# Patient Record
Sex: FEMALE | Race: WHITE | NOT HISPANIC OR LATINO | Employment: FULL TIME | URBAN - METROPOLITAN AREA
[De-identification: names, ages, dates, MRNs, and addresses within clinical notes are randomized per-mention and may not be internally consistent; named-entity substitution may affect disease eponyms.]

---

## 2019-02-26 ENCOUNTER — PROCEDURE VISIT (OUTPATIENT)
Dept: OBSTETRICS AND GYNECOLOGY | Facility: CLINIC | Age: 39
End: 2019-02-26

## 2019-02-26 ENCOUNTER — PREP FOR SURGERY (OUTPATIENT)
Dept: OTHER | Facility: HOSPITAL | Age: 39
End: 2019-02-26

## 2019-02-26 ENCOUNTER — OFFICE VISIT (OUTPATIENT)
Dept: OBSTETRICS AND GYNECOLOGY | Facility: CLINIC | Age: 39
End: 2019-02-26

## 2019-02-26 VITALS
SYSTOLIC BLOOD PRESSURE: 120 MMHG | BODY MASS INDEX: 34.8 KG/M2 | HEIGHT: 69 IN | WEIGHT: 235 LBS | DIASTOLIC BLOOD PRESSURE: 70 MMHG

## 2019-02-26 DIAGNOSIS — D50.8 OTHER IRON DEFICIENCY ANEMIA: ICD-10-CM

## 2019-02-26 DIAGNOSIS — D21.9 FIBROID: ICD-10-CM

## 2019-02-26 DIAGNOSIS — N92.1 MENOMETRORRHAGIA: Primary | ICD-10-CM

## 2019-02-26 DIAGNOSIS — N93.9 VAGINAL BLEEDING: Primary | ICD-10-CM

## 2019-02-26 DIAGNOSIS — D25.1 INTRAMURAL LEIOMYOMA OF UTERUS: ICD-10-CM

## 2019-02-26 LAB
HCT VFR BLD AUTO: 36.2 % (ref 34–46.6)
HGB BLD-MCNC: 9.2 G/DL (ref 12–15.9)

## 2019-02-26 PROCEDURE — 76856 US EXAM PELVIC COMPLETE: CPT | Performed by: OBSTETRICS & GYNECOLOGY

## 2019-02-26 PROCEDURE — 99204 OFFICE O/P NEW MOD 45 MIN: CPT | Performed by: OBSTETRICS & GYNECOLOGY

## 2019-02-26 RX ORDER — SODIUM CHLORIDE 0.9 % (FLUSH) 0.9 %
3 SYRINGE (ML) INJECTION EVERY 12 HOURS SCHEDULED
Status: CANCELLED | OUTPATIENT
Start: 2019-02-26

## 2019-02-26 RX ORDER — IRON POLYSACCHARIDE COMPLEX 150 MG
150 CAPSULE ORAL 2 TIMES DAILY
COMMUNITY

## 2019-02-26 RX ORDER — SODIUM CHLORIDE 9 MG/ML
40 INJECTION, SOLUTION INTRAVENOUS AS NEEDED
Status: CANCELLED | OUTPATIENT
Start: 2019-02-26

## 2019-02-26 RX ORDER — SODIUM CHLORIDE 0.9 % (FLUSH) 0.9 %
1-10 SYRINGE (ML) INJECTION AS NEEDED
Status: CANCELLED | OUTPATIENT
Start: 2019-02-26

## 2019-02-26 RX ORDER — CEFAZOLIN SODIUM 2 G/50ML
2 SOLUTION INTRAVENOUS ONCE
Status: CANCELLED | OUTPATIENT
Start: 2019-02-26

## 2019-02-26 RX ORDER — UBIDECARENONE 75 MG
50 CAPSULE ORAL DAILY
COMMUNITY

## 2019-02-26 NOTE — PROGRESS NOTES
"PROBLEM VISIT    Chief Complaint: menometrorrhagia      April Pieter is a 38 y.o. patient who presents as a new patient. Pt reports she is having menometrorrhagia. She is getting her cycle every month and it is lasting 14 days. She bleeds very heavy for 1-2 days with pad/tampon changes q 1 hour. Pt was seen by her primary care physician and fond to have a hgb of 7. Pt was seen by a gynecologist and had a pap smear done in 2018- normal. Pt saw her gynecologist for her bleeding and was placed on OCPs. Pt reports that she took the pill but she was getting HAs so she stopped the meds. . Pt is not interested in more children. She is currently on iron. Pt started her cycle 2 days ago. TVUS performed today reveals 8x8cm fibroid.    Chief Complaint   Patient presents with   • Menstrual Problem   • Pelvic Pain             The following portions of the patient's history were reviewed and updated as appropriate: allergies, current medications and problem list.    Review of Systems   Constitutional: Negative for diaphoresis and unexpected weight change.   Respiratory: Negative for shortness of breath.    Cardiovascular: Negative for chest pain.   Gastrointestinal: Negative for abdominal distention, abdominal pain and blood in stool.   Endocrine: Negative for cold intolerance and heat intolerance.   Genitourinary: Positive for menstrual problem, vaginal bleeding and vaginal pain. Negative for vaginal discharge.   Musculoskeletal: Negative for back pain and gait problem.   Neurological: Negative for dizziness, syncope, light-headedness and headaches.   Hematological: Does not bruise/bleed easily.   Psychiatric/Behavioral: Negative for agitation, behavioral problems and dysphoric mood.       /70   Ht 175.3 cm (69\")   Wt 107 kg (235 lb)   LMP 2019   Breastfeeding? No   BMI 34.70 kg/m²     Physical Exam   Constitutional: She is oriented to person, place, and time. She appears well-developed and " well-nourished. No distress.   Cardiovascular: Normal rate, regular rhythm and normal heart sounds.   Pulmonary/Chest: Effort normal and breath sounds normal. No stridor. No respiratory distress. She has no wheezes.   Abdominal: Soft. Bowel sounds are normal. She exhibits no distension. There is no tenderness. There is no guarding.   Genitourinary: There is no rash, tenderness, lesion or injury on the right labia. There is no rash, tenderness, lesion or injury on the left labia. Uterus is enlarged. Uterus is not deviated, not fixed and not tender. Cervix exhibits no motion tenderness, no discharge and no friability. Right adnexum displays no mass, no tenderness and no fullness. Left adnexum displays no mass, no tenderness and no fullness. There is bleeding in the vagina. No erythema or tenderness in the vagina. No foreign body in the vagina. No signs of injury around the vagina. No vaginal discharge found.   Neurological: She is alert and oriented to person, place, and time. No cranial nerve deficit or sensory deficit. Coordination normal.   Skin: Skin is warm. She is not diaphoretic. There is pallor.   Psychiatric: She has a normal mood and affect. Her behavior is normal. Judgment and thought content normal.   Vitals reviewed.        Assessment/Plan   April was seen today for menstrual problem and pelvic pain.    Diagnoses and all orders for this visit:    Menometrorrhagia    Intramural leiomyoma of uterus    Other iron deficiency anemia  -     Hemoglobin & Hematocrit, Blood    38-year-old with menometrorrhagia, anemia, and fibroid uterus    1)menometrorrhagia: Pt with extremely heavy menstrual cycles.  Patient will bleed heavily and up to 2 weeks.  Etiology of her bleeding is most likely related to her large fibroid uterus measuring 8 x 8 cm on today's transvaginal ultrasound.  Ovaries were noted to be normal bilaterally.  Patient's bleeding is so heavy that she is anemic.  Her hemoglobin has been as low as 7.   Patient currently is on iron.  A long discussion was had with patient concerning her chronic anemia due to her abnormal uterine bleeding.  Patient was offered a hysterectomy to treat her abnormal uterine bleeding and anemia.  Patient is finished with childbearing.  The fibroid measuring 8 x 8 cm may limit a laparoscopic hysterectomy.  I reviewed a laparoscopic hysterectomy as well as an exploratory laparotomy with abdominal hysterectomy with the patient.  We discussed the pre-intra-and post procedure and all questions were answered.  The risks of the procedure were reviewed including risk of bleeding and blood transfusion, risk of infection, damage to bowel and bladder, damage to ureters, and risk of DVT.  We will check a hemoglobin today and I reviewed with the patient that she may need to be admitted preoperatively for blood transfusion.     2) anemia:  is currently taking iron twice a day.  Patient was encouraged to continue taking her iron supplements.  We will check a hemoglobin today in preparation for hysterectomy.  If patient's hemoglobin is less than 9 that she'll need to be admitted for preoperative blood transfusion prior to proceeding with surgery.    3) fibroid uterus: Transvaginal ultrasound today reveals an intramural fibroid measuring 8 x 8 x 8 cm.  We discussed hysterectomy to remove the fibroid and that attempts would be made to remove this laparoscopically.  Patient is aware that there is a risk of exploratory lap porotomy with total abdominal hysterectomy.    4) gyn HM: LPS 2/2019.            No Follow-up on file.      Rosemarie Garay DO    2/27/2019  2:24 PM

## 2019-02-27 ENCOUNTER — TELEPHONE (OUTPATIENT)
Dept: OBSTETRICS AND GYNECOLOGY | Facility: CLINIC | Age: 39
End: 2019-02-27

## 2019-02-27 PROBLEM — D25.1 INTRAMURAL LEIOMYOMA OF UTERUS: Status: ACTIVE | Noted: 2019-02-27

## 2019-02-27 PROBLEM — N92.1 MENOMETRORRHAGIA: Status: ACTIVE | Noted: 2019-02-27

## 2019-02-27 PROBLEM — D64.9 ABSOLUTE ANEMIA: Status: ACTIVE | Noted: 2019-02-27

## 2019-02-27 NOTE — TELEPHONE ENCOUNTER
We need to continue and try to get in touch with this pt. I want to make sure she schedules her surgery and make her aware that her hgb has improved on iron.

## 2019-02-27 NOTE — TELEPHONE ENCOUNTER
I spoke with her just now, she is very excited to get the news, she said she is going to try to get it scheduled for the beginning of April.

## 2019-03-22 ENCOUNTER — HOSPITAL ENCOUNTER (OUTPATIENT)
Facility: HOSPITAL | Age: 39
Setting detail: SURGERY ADMIT
End: 2019-03-22
Attending: OBSTETRICS & GYNECOLOGY | Admitting: OBSTETRICS & GYNECOLOGY

## 2019-03-26 ENCOUNTER — APPOINTMENT (OUTPATIENT)
Dept: PREADMISSION TESTING | Facility: HOSPITAL | Age: 39
End: 2019-03-26

## 2019-03-26 VITALS
DIASTOLIC BLOOD PRESSURE: 78 MMHG | HEART RATE: 102 BPM | RESPIRATION RATE: 16 BRPM | SYSTOLIC BLOOD PRESSURE: 126 MMHG | OXYGEN SATURATION: 100 % | HEIGHT: 69 IN | BODY MASS INDEX: 35 KG/M2 | WEIGHT: 236.3 LBS

## 2019-03-26 DIAGNOSIS — N92.1 MENOMETRORRHAGIA: ICD-10-CM

## 2019-03-26 LAB
ABO GROUP BLD: NORMAL
ABO GROUP BLD: NORMAL
BLD GP AB SCN SERPL QL: NEGATIVE
DEPRECATED RDW RBC AUTO: 48.6 FL (ref 37–54)
ERYTHROCYTE [DISTWIDTH] IN BLOOD BY AUTOMATED COUNT: 18.8 % (ref 12.3–15.4)
HCT VFR BLD AUTO: 30.3 % (ref 34–46.6)
HGB BLD-MCNC: 8 G/DL (ref 12–15.9)
MCH RBC QN AUTO: 19.2 PG (ref 26.6–33)
MCHC RBC AUTO-ENTMCNC: 26.4 G/DL (ref 31.5–35.7)
MCV RBC AUTO: 72.8 FL (ref 79–97)
PLATELET # BLD AUTO: 392 10*3/MM3 (ref 140–450)
PMV BLD AUTO: 9.4 FL (ref 6–12)
RBC # BLD AUTO: 4.16 10*6/MM3 (ref 3.77–5.28)
RH BLD: POSITIVE
RH BLD: POSITIVE
T&S EXPIRATION DATE: NORMAL
WBC NRBC COR # BLD: 8.93 10*3/MM3 (ref 3.4–10.8)

## 2019-03-26 PROCEDURE — 86900 BLOOD TYPING SEROLOGIC ABO: CPT | Performed by: OBSTETRICS & GYNECOLOGY

## 2019-03-26 PROCEDURE — 86901 BLOOD TYPING SEROLOGIC RH(D): CPT | Performed by: OBSTETRICS & GYNECOLOGY

## 2019-03-26 PROCEDURE — 86900 BLOOD TYPING SEROLOGIC ABO: CPT

## 2019-03-26 PROCEDURE — 86901 BLOOD TYPING SEROLOGIC RH(D): CPT

## 2019-03-26 PROCEDURE — 85027 COMPLETE CBC AUTOMATED: CPT | Performed by: OBSTETRICS & GYNECOLOGY

## 2019-03-26 PROCEDURE — 86850 RBC ANTIBODY SCREEN: CPT | Performed by: OBSTETRICS & GYNECOLOGY

## 2019-03-26 PROCEDURE — 36415 COLL VENOUS BLD VENIPUNCTURE: CPT

## 2019-03-26 NOTE — DISCHARGE INSTRUCTIONS
PRE-ADMISSION TESTING INSTRUCTIONS FOR ADULTS    Take these medications the morning of surgery with a small sip of water:      No aspirin, advil, aleve, ibuprofen, naproxen, diet pills, decongestants, or herbal/vitamins for a week prior to surgery.    General Instructions:    • Do not eat solid food after midnight the night before surgery.  No gum, mints, or hard candy after midnight the night before surgery.  • You may drink clear liquids the day of surgery up until 2 hours before your arrival time.  (Until 55:30 am)  • Clear liquids are liquids you can see through. Nothing RED in color.    Plain water    Sports drinks  Sodas     Gelatin (Jell-O)  Fruit juices without pulp such as white grape juice and apple juice  Popsicles that contain no fruit or yogurt  Tea or coffee (no cream or milk added)    • It is beneficial for you to have a clear drink that contains carbohydrates just before you leave your house and before your fasting time begins.  We suggest a 20 ounce bottle of Gatorade or Powerade for non-diabetic patients or a 20 ounce bottle of G2 or Powerade Zero for diabetic patients.     • Patients who avoid smoking, chewing tobacco and alcohol for 4 weeks prior to surgery have a reduced risk of post-operative complications.  If at all possible, quit smoking as many days before surgery as you can.    • Do not smoke, use chewing tobacco or drink alcohol the day of surgery    • Bring your C-PAP/ BI-PAP machine if you use one.  • Wear clean comfortable clothes and socks.  • Do not wear contact lenses, lotion, deodorant, or make-up.  Bring a case for your glasses if applicable. You may brush your teeth the morning of surgery.  • You may wear dentures/partials, do not put adhesive/glue on them.  • Bring crutches or walker if applicable.  • Leave all other jewelry and valuables at home.      Preventing a Surgical Site Infection:    • Shower the night before and on the morning of surgery using the chlorhexidine soap  you were given.  Use a clean washcloth with the soap.  Place clean sheets on your bed after showering the night before surgery. Do not use the CHG soap on your hair, face, or private areas. Wash your body gently for five (5) minutes. Do not scrub your skin.  Dry with a clean towel and dress in clean clothing.    • Do not shave the surgical area for 10 days-2 weeks prior to surgery  because the razor can irritate skin and make it easier to develop an infection.  • Make sure you, your family, and all healthcare providers clean their hands with soap and water or an alcohol based hand  before caring for you or your wound.      Day of surgery:    Your surgeon’s office will advise you of your arrival time for the day of surgery.    Upon arrival, a Pre-op nurse and Anesthesia provider will review your health history, obtain vital signs, and answer questions you may have.  The only belongings needed at this time will be your home medications and if applicable your C-PAP/BI-PAP machine.  If you are staying overnight your family can leave the rest of your belongings in the car and bring them to your room later.  A Pre-op nurse will start an IV and you may receive medication in preparation for surgery, including something to help you relax.  Your family will be able to see you in the Pre-op area.  While you are in surgery your family should notify the waiting room  if they leave the waiting room area and provide a contact phone number.    IF you have any questions, you can call the Pre-Admission Department at [phone] or your surgeon's office.  Notify your surgeon if  you become sick, have a fever, productive cough, or cannot be here the day of surgery    Please be aware that surgery does come with discomfort.  We want to make every effort to control your discomfort so please discuss any uncontrolled symptoms with your nurse.   Your doctor will most likely have prescribed pain medications.      If  you are going home after surgery, you will receive individualized written care instructions before being discharged.  A responsible adult (over the age of 18) must drive you to and from the hospital on the day of your surgery and stay with you for 24 hours after anesthesia.    If you are staying overnight following surgery, you will be transported to your hospital room following the recovery period.  [facility] has all private rooms.    You may receive a survey regarding the care you received. Your feedback is very important and will be used to collect the necessary data to help us to continue to provide excellent care.     Deductibles and co-payments are collected on the day of service. Please be prepared to pay the required co-pay, deductible or deposit on the day of service as defined by your plan.    Total Laparoscopic Hysterectomy  A total laparoscopic hysterectomy is a minimally invasive surgery to remove your uterus and cervix. This surgery is performed by making several small cuts (incisions) in your abdomen. It can also be done with a thin, lighted tube (laparoscope) inserted into two small incisions in your lower abdomen. Your fallopian tubes and ovaries can be removed (bilateral salpingo-oophorectomy) during this surgery as well. Benefits of minimally invasive surgery include:  · Less pain.  · Less risk of blood loss.  · Less risk of infection.  · Quicker return to normal activities.  LET YOUR HEALTH CARE PROVIDER KNOW ABOUT:  · Any allergies you have.  · All medicines you are taking, including vitamins, herbs, eye drops, creams, and over-the-counter medicines.  · Previous problems you or members of your family have had with the use of anesthetics.  · Any blood disorders you have.  · Previous surgeries you have had.  · Medical conditions you have.  RISKS AND COMPLICATIONS   Generally, this is a safe procedure. However, as with any procedure, complications can occur. Possible complications  include:  · Bleeding.  · Blood clots in the legs or lung.  · Infection.  · Injury to surrounding organs.  · Problems with anesthesia.  · Early menopause symptoms (hot flashes, night sweats, insomnia).  · Risk of conversion to an open abdominal incision.  BEFORE THE PROCEDURE  · Ask your health care provider about changing or stopping your regular medicines.  · Do not take aspirin or blood thinners (anticoagulants) for 1 week before the surgery or as told by your health care provider.  · Do not eat or drink anything for 8 hours before the surgery or as told by your health care provider.  · Quit smoking if you smoke.  · Arrange for a ride home after surgery and for someone to help you at home during recovery.  PROCEDURE   · You will be given antibiotic medicine.  · An IV tube will be placed in your arm. You will be given medicine to make you sleep (general anesthetic).  · A peterson catheter will be placed to drain your bladder during surgery. This is usually removed within 24 hours.  · A gas (carbon dioxide) will be used to inflate your abdomen. This will allow your surgeon to look inside your abdomen, perform your surgery, and treat any other problems found if necessary.  · Three or four small incisions (often less than 1/2 inch) will be made in your abdomen. One of these incisions will be made in the area of your belly button (navel). The laparoscope will be inserted into the incision. Your surgeon will look through the laparoscope while doing your procedure.  · Other surgical instruments will be inserted through the other incisions.  · Your uterus may be removed through your vagina or cut into small pieces and removed through the small incisions.  · Your incisions will be closed.  AFTER THE PROCEDURE  · The gas will be released from inside your abdomen.  · You will be taken to the recovery area where a nurse will watch and check your progress. Once you are awake, stable, and taking fluids well, without other  problems, you will return to your room or be allowed to go home.  · There is usually minimal discomfort following the surgery because the incisions are so small.  · You will be given pain medicine while you are in the hospital and for when you go home.     This information is not intended to replace advice given to you by your health care provider. Make sure you discuss any questions you have with your health care provider.     Document Released: 10/14/2008 Document Revised: 08/20/2014 Document Reviewed: 07/08/2014  [employer]® Patient Information ©2016 [employer], [employer].

## 2019-03-26 NOTE — PAT
Pt and mother here for PAT visit.  Pre-op tests completed, chg soap given, and instructions reviewed.  Instructed clears until 5:30 am dos, voiced understanding.

## 2019-03-27 ENCOUNTER — TELEPHONE (OUTPATIENT)
Dept: OBSTETRICS AND GYNECOLOGY | Facility: CLINIC | Age: 39
End: 2019-03-27

## 2019-03-27 NOTE — TELEPHONE ENCOUNTER
She does this every month she has a cycle. She needs to make sure she is taking iron twice a day. She may need a stool softener. Did I order her surgery?

## 2019-04-02 ENCOUNTER — HOSPITAL ENCOUNTER (INPATIENT)
Facility: HOSPITAL | Age: 39
LOS: 3 days | Discharge: HOME | End: 2019-04-05
Attending: OBSTETRICS & GYNECOLOGY | Admitting: OBSTETRICS & GYNECOLOGY

## 2019-04-02 ENCOUNTER — ANESTHESIA EVENT (OUTPATIENT)
Dept: PERIOP | Facility: HOSPITAL | Age: 39
End: 2019-04-02

## 2019-04-02 DIAGNOSIS — N92.1 MENOMETRORRHAGIA: ICD-10-CM

## 2019-04-02 LAB
ABO GROUP BLD: NORMAL
AMPHET+METHAMPHET UR QL: NEGATIVE
AMPHETAMINES UR QL: NEGATIVE
BARBITURATES UR QL SCN: NEGATIVE
BENZODIAZ UR QL SCN: NEGATIVE
BLD GP AB SCN SERPL QL: NEGATIVE
BUPRENORPHINE SERPL-MCNC: NEGATIVE NG/ML
CANNABINOIDS SERPL QL: NEGATIVE
COCAINE UR QL: NEGATIVE
METHADONE UR QL SCN: NEGATIVE
OPIATES UR QL: NEGATIVE
OXYCODONE UR QL SCN: NEGATIVE
PCP UR QL SCN: NEGATIVE
PROPOXYPH UR QL: NEGATIVE
RH BLD: POSITIVE
T&S EXPIRATION DATE: NORMAL
TRICYCLICS UR QL SCN: NEGATIVE

## 2019-04-02 PROCEDURE — P9016 RBC LEUKOCYTES REDUCED: HCPCS

## 2019-04-02 PROCEDURE — 86901 BLOOD TYPING SEROLOGIC RH(D): CPT | Performed by: OBSTETRICS & GYNECOLOGY

## 2019-04-02 PROCEDURE — 86850 RBC ANTIBODY SCREEN: CPT | Performed by: OBSTETRICS & GYNECOLOGY

## 2019-04-02 PROCEDURE — 86900 BLOOD TYPING SEROLOGIC ABO: CPT

## 2019-04-02 PROCEDURE — 36430 TRANSFUSION BLD/BLD COMPNT: CPT

## 2019-04-02 PROCEDURE — 86900 BLOOD TYPING SEROLOGIC ABO: CPT | Performed by: OBSTETRICS & GYNECOLOGY

## 2019-04-02 PROCEDURE — 80306 DRUG TEST PRSMV INSTRMNT: CPT | Performed by: OBSTETRICS & GYNECOLOGY

## 2019-04-02 PROCEDURE — 86923 COMPATIBILITY TEST ELECTRIC: CPT

## 2019-04-02 RX ORDER — SODIUM CHLORIDE 9 MG/ML
40 INJECTION, SOLUTION INTRAVENOUS AS NEEDED
Status: DISCONTINUED | OUTPATIENT
Start: 2019-04-02 | End: 2019-04-03

## 2019-04-02 RX ORDER — SODIUM CHLORIDE 0.9 % (FLUSH) 0.9 %
3 SYRINGE (ML) INJECTION EVERY 12 HOURS SCHEDULED
Status: CANCELLED | OUTPATIENT
Start: 2019-04-02

## 2019-04-02 RX ORDER — ZOLPIDEM TARTRATE 5 MG/1
5 TABLET ORAL NIGHTLY PRN
Status: DISCONTINUED | OUTPATIENT
Start: 2019-04-02 | End: 2019-04-03

## 2019-04-02 RX ORDER — SODIUM CHLORIDE 0.9 % (FLUSH) 0.9 %
3 SYRINGE (ML) INJECTION EVERY 12 HOURS SCHEDULED
Status: DISCONTINUED | OUTPATIENT
Start: 2019-04-02 | End: 2019-04-03

## 2019-04-02 RX ORDER — LIDOCAINE HYDROCHLORIDE 10 MG/ML
0.5 INJECTION, SOLUTION EPIDURAL; INFILTRATION; INTRACAUDAL; PERINEURAL ONCE AS NEEDED
Status: CANCELLED | OUTPATIENT
Start: 2019-04-02

## 2019-04-02 RX ORDER — SODIUM CHLORIDE 0.9 % (FLUSH) 0.9 %
3-10 SYRINGE (ML) INJECTION AS NEEDED
Status: DISCONTINUED | OUTPATIENT
Start: 2019-04-02 | End: 2019-04-03

## 2019-04-02 RX ORDER — SODIUM CHLORIDE 9 MG/ML
INJECTION, SOLUTION INTRAVENOUS
Status: COMPLETED
Start: 2019-04-02 | End: 2019-04-02

## 2019-04-02 RX ORDER — SODIUM CHLORIDE 9 MG/ML
INJECTION, SOLUTION INTRAVENOUS
Status: COMPLETED
Start: 2019-04-02 | End: 2019-04-03

## 2019-04-02 RX ORDER — SODIUM CHLORIDE 9 MG/ML
40 INJECTION, SOLUTION INTRAVENOUS AS NEEDED
Status: CANCELLED | OUTPATIENT
Start: 2019-04-02

## 2019-04-02 RX ORDER — SODIUM CHLORIDE 0.9 % (FLUSH) 0.9 %
1-10 SYRINGE (ML) INJECTION AS NEEDED
Status: CANCELLED | OUTPATIENT
Start: 2019-04-02

## 2019-04-02 RX ORDER — SODIUM CHLORIDE, SODIUM LACTATE, POTASSIUM CHLORIDE, CALCIUM CHLORIDE 600; 310; 30; 20 MG/100ML; MG/100ML; MG/100ML; MG/100ML
9 INJECTION, SOLUTION INTRAVENOUS CONTINUOUS
Status: CANCELLED | OUTPATIENT
Start: 2019-04-02

## 2019-04-02 RX ADMIN — SODIUM CHLORIDE 40 ML: 9 INJECTION, SOLUTION INTRAVENOUS at 21:39

## 2019-04-03 ENCOUNTER — ANESTHESIA (OUTPATIENT)
Dept: PERIOP | Facility: HOSPITAL | Age: 39
End: 2019-04-03

## 2019-04-03 PROBLEM — D50.0 IRON DEFICIENCY ANEMIA DUE TO CHRONIC BLOOD LOSS: Status: ACTIVE | Noted: 2019-02-27

## 2019-04-03 LAB
ABO + RH BLD: NORMAL
ABO + RH BLD: NORMAL
BH BB BLOOD EXPIRATION DATE: NORMAL
BH BB BLOOD EXPIRATION DATE: NORMAL
BH BB BLOOD TYPE BARCODE: 6200
BH BB BLOOD TYPE BARCODE: 6200
BH BB DISPENSE STATUS: NORMAL
BH BB DISPENSE STATUS: NORMAL
BH BB PRODUCT CODE: NORMAL
BH BB PRODUCT CODE: NORMAL
BH BB UNIT NUMBER: NORMAL
BH BB UNIT NUMBER: NORMAL
DEPRECATED RDW RBC AUTO: 55.6 FL (ref 37–54)
ERYTHROCYTE [DISTWIDTH] IN BLOOD BY AUTOMATED COUNT: 20.3 % (ref 12.3–15.4)
HCG SERPL QL: NEGATIVE
HCT VFR BLD AUTO: 34 % (ref 34–46.6)
HGB BLD-MCNC: 9.5 G/DL (ref 12–15.9)
MCH RBC QN AUTO: 21.3 PG (ref 26.6–33)
MCHC RBC AUTO-ENTMCNC: 27.9 G/DL (ref 31.5–35.7)
MCV RBC AUTO: 76.4 FL (ref 79–97)
PLATELET # BLD AUTO: 329 10*3/MM3 (ref 140–450)
PMV BLD AUTO: 9 FL (ref 6–12)
RBC # BLD AUTO: 4.45 10*6/MM3 (ref 3.77–5.28)
UNIT  ABO: NORMAL
UNIT  ABO: NORMAL
UNIT  RH: NORMAL
UNIT  RH: NORMAL
WBC NRBC COR # BLD: 6.74 10*3/MM3 (ref 3.4–10.8)

## 2019-04-03 PROCEDURE — 0WJJ4ZZ INSPECTION OF PELVIC CAVITY, PERCUTANEOUS ENDOSCOPIC APPROACH: ICD-10-PCS | Performed by: OBSTETRICS & GYNECOLOGY

## 2019-04-03 PROCEDURE — 25010000002 FENTANYL CITRATE (PF) 100 MCG/2ML SOLUTION: Performed by: NURSE ANESTHETIST, CERTIFIED REGISTERED

## 2019-04-03 PROCEDURE — 25010000002 PROPOFOL 10 MG/ML EMULSION: Performed by: NURSE ANESTHETIST, CERTIFIED REGISTERED

## 2019-04-03 PROCEDURE — 0UT90ZZ RESECTION OF UTERUS, OPEN APPROACH: ICD-10-PCS | Performed by: OBSTETRICS & GYNECOLOGY

## 2019-04-03 PROCEDURE — 85027 COMPLETE CBC AUTOMATED: CPT | Performed by: OBSTETRICS & GYNECOLOGY

## 2019-04-03 PROCEDURE — P9016 RBC LEUKOCYTES REDUCED: HCPCS

## 2019-04-03 PROCEDURE — 25010000002 MIDAZOLAM PER 1 MG: Performed by: NURSE ANESTHETIST, CERTIFIED REGISTERED

## 2019-04-03 PROCEDURE — S0260 H&P FOR SURGERY: HCPCS | Performed by: OBSTETRICS & GYNECOLOGY

## 2019-04-03 PROCEDURE — 88307 TISSUE EXAM BY PATHOLOGIST: CPT | Performed by: OBSTETRICS & GYNECOLOGY

## 2019-04-03 PROCEDURE — 25010000002 SUCCINYLCHOLINE PER 20 MG: Performed by: NURSE ANESTHETIST, CERTIFIED REGISTERED

## 2019-04-03 PROCEDURE — 25010000002 DEXAMETHASONE PER 1 MG: Performed by: NURSE ANESTHETIST, CERTIFIED REGISTERED

## 2019-04-03 PROCEDURE — 0UT70ZZ RESECTION OF BILATERAL FALLOPIAN TUBES, OPEN APPROACH: ICD-10-PCS | Performed by: OBSTETRICS & GYNECOLOGY

## 2019-04-03 PROCEDURE — 25010000002 ONDANSETRON PER 1 MG: Performed by: NURSE ANESTHETIST, CERTIFIED REGISTERED

## 2019-04-03 PROCEDURE — 36430 TRANSFUSION BLD/BLD COMPNT: CPT

## 2019-04-03 PROCEDURE — 94799 UNLISTED PULMONARY SVC/PX: CPT

## 2019-04-03 PROCEDURE — 25010000003 CEFAZOLIN SODIUM-DEXTROSE 2-3 GM-%(50ML) RECONSTITUTED SOLUTION: Performed by: OBSTETRICS & GYNECOLOGY

## 2019-04-03 PROCEDURE — 86900 BLOOD TYPING SEROLOGIC ABO: CPT

## 2019-04-03 PROCEDURE — 25010000002 DIPHENHYDRAMINE PER 50 MG: Performed by: NURSE ANESTHETIST, CERTIFIED REGISTERED

## 2019-04-03 PROCEDURE — 25010000002 HYDROMORPHONE 1 MG/ML SOLUTION: Performed by: NURSE ANESTHETIST, CERTIFIED REGISTERED

## 2019-04-03 PROCEDURE — 58150 TOTAL HYSTERECTOMY: CPT | Performed by: OBSTETRICS & GYNECOLOGY

## 2019-04-03 PROCEDURE — 25010000002 KETOROLAC TROMETHAMINE PER 15 MG: Performed by: NURSE ANESTHETIST, CERTIFIED REGISTERED

## 2019-04-03 PROCEDURE — 25010000002 KETOROLAC TROMETHAMINE PER 15 MG

## 2019-04-03 PROCEDURE — 25010000002 NEOSTIGMINE PER 0.5 MG: Performed by: NURSE ANESTHETIST, CERTIFIED REGISTERED

## 2019-04-03 PROCEDURE — 25010000002 MORPHINE PER 10 MG: Performed by: NURSE ANESTHETIST, CERTIFIED REGISTERED

## 2019-04-03 PROCEDURE — 84703 CHORIONIC GONADOTROPIN ASSAY: CPT | Performed by: OBSTETRICS & GYNECOLOGY

## 2019-04-03 RX ORDER — MIDAZOLAM HYDROCHLORIDE 1 MG/ML
1 INJECTION INTRAMUSCULAR; INTRAVENOUS
Status: DISCONTINUED | OUTPATIENT
Start: 2019-04-03 | End: 2019-04-03 | Stop reason: HOSPADM

## 2019-04-03 RX ORDER — SIMETHICONE 20 MG/.3ML
EMULSION ORAL
Status: COMPLETED
Start: 2019-04-03 | End: 2019-04-03

## 2019-04-03 RX ORDER — LORAZEPAM 2 MG/ML
0.5 INJECTION INTRAMUSCULAR
Status: DISCONTINUED | OUTPATIENT
Start: 2019-04-03 | End: 2019-04-03 | Stop reason: HOSPADM

## 2019-04-03 RX ORDER — DIPHENHYDRAMINE HCL 25 MG
25 CAPSULE ORAL EVERY 4 HOURS PRN
Status: DISCONTINUED | OUTPATIENT
Start: 2019-04-03 | End: 2019-04-05 | Stop reason: HOSPADM

## 2019-04-03 RX ORDER — MEPERIDINE HYDROCHLORIDE 25 MG/ML
12.5 INJECTION INTRAMUSCULAR; INTRAVENOUS; SUBCUTANEOUS
Status: DISCONTINUED | OUTPATIENT
Start: 2019-04-03 | End: 2019-04-03 | Stop reason: HOSPADM

## 2019-04-03 RX ORDER — PROMETHAZINE HYDROCHLORIDE 25 MG/ML
6.25 INJECTION, SOLUTION INTRAMUSCULAR; INTRAVENOUS ONCE AS NEEDED
Status: DISCONTINUED | OUTPATIENT
Start: 2019-04-03 | End: 2019-04-03 | Stop reason: HOSPADM

## 2019-04-03 RX ORDER — ONDANSETRON 2 MG/ML
4 INJECTION INTRAMUSCULAR; INTRAVENOUS ONCE AS NEEDED
Status: COMPLETED | OUTPATIENT
Start: 2019-04-03 | End: 2019-04-03

## 2019-04-03 RX ORDER — DEXAMETHASONE SODIUM PHOSPHATE 4 MG/ML
8 INJECTION, SOLUTION INTRA-ARTICULAR; INTRALESIONAL; INTRAMUSCULAR; INTRAVENOUS; SOFT TISSUE ONCE AS NEEDED
Status: COMPLETED | OUTPATIENT
Start: 2019-04-03 | End: 2019-04-03

## 2019-04-03 RX ORDER — GLYCOPYRROLATE 0.2 MG/ML
INJECTION INTRAMUSCULAR; INTRAVENOUS AS NEEDED
Status: DISCONTINUED | OUTPATIENT
Start: 2019-04-03 | End: 2019-04-03 | Stop reason: SURG

## 2019-04-03 RX ORDER — ONDANSETRON 2 MG/ML
4 INJECTION INTRAMUSCULAR; INTRAVENOUS ONCE AS NEEDED
Status: DISCONTINUED | OUTPATIENT
Start: 2019-04-03 | End: 2019-04-03 | Stop reason: HOSPADM

## 2019-04-03 RX ORDER — OXYCODONE HYDROCHLORIDE AND ACETAMINOPHEN 5; 325 MG/1; MG/1
1 TABLET ORAL EVERY 4 HOURS PRN
Status: DISCONTINUED | OUTPATIENT
Start: 2019-04-04 | End: 2019-04-05 | Stop reason: HOSPADM

## 2019-04-03 RX ORDER — SODIUM CHLORIDE 0.9 % (FLUSH) 0.9 %
1-10 SYRINGE (ML) INJECTION AS NEEDED
Status: DISCONTINUED | OUTPATIENT
Start: 2019-04-03 | End: 2019-04-03 | Stop reason: HOSPADM

## 2019-04-03 RX ORDER — DOCUSATE SODIUM 100 MG/1
100 CAPSULE, LIQUID FILLED ORAL 2 TIMES DAILY PRN
Status: DISCONTINUED | OUTPATIENT
Start: 2019-04-03 | End: 2019-04-05 | Stop reason: HOSPADM

## 2019-04-03 RX ORDER — SIMETHICONE 80 MG
80 TABLET,CHEWABLE ORAL 4 TIMES DAILY PRN
Status: DISCONTINUED | OUTPATIENT
Start: 2019-04-03 | End: 2019-04-05 | Stop reason: HOSPADM

## 2019-04-03 RX ORDER — MAGNESIUM HYDROXIDE 1200 MG/15ML
LIQUID ORAL AS NEEDED
Status: DISCONTINUED | OUTPATIENT
Start: 2019-04-03 | End: 2019-04-03 | Stop reason: HOSPADM

## 2019-04-03 RX ORDER — PROMETHAZINE HYDROCHLORIDE 25 MG/1
25 SUPPOSITORY RECTAL ONCE AS NEEDED
Status: DISCONTINUED | OUTPATIENT
Start: 2019-04-03 | End: 2019-04-03 | Stop reason: HOSPADM

## 2019-04-03 RX ORDER — DIPHENHYDRAMINE HYDROCHLORIDE 50 MG/ML
25 INJECTION INTRAMUSCULAR; INTRAVENOUS EVERY 4 HOURS PRN
Status: DISCONTINUED | OUTPATIENT
Start: 2019-04-03 | End: 2019-04-05 | Stop reason: HOSPADM

## 2019-04-03 RX ORDER — SODIUM CHLORIDE, SODIUM LACTATE, POTASSIUM CHLORIDE, CALCIUM CHLORIDE 600; 310; 30; 20 MG/100ML; MG/100ML; MG/100ML; MG/100ML
100 INJECTION, SOLUTION INTRAVENOUS CONTINUOUS
Status: DISCONTINUED | OUTPATIENT
Start: 2019-04-03 | End: 2019-04-05 | Stop reason: HOSPADM

## 2019-04-03 RX ORDER — KETOROLAC TROMETHAMINE 30 MG/ML
INJECTION, SOLUTION INTRAMUSCULAR; INTRAVENOUS AS NEEDED
Status: DISCONTINUED | OUTPATIENT
Start: 2019-04-03 | End: 2019-04-03 | Stop reason: SURG

## 2019-04-03 RX ORDER — SODIUM CHLORIDE 0.9 % (FLUSH) 0.9 %
3 SYRINGE (ML) INJECTION EVERY 12 HOURS SCHEDULED
Status: DISCONTINUED | OUTPATIENT
Start: 2019-04-03 | End: 2019-04-03 | Stop reason: HOSPADM

## 2019-04-03 RX ORDER — KETOROLAC TROMETHAMINE 30 MG/ML
INJECTION, SOLUTION INTRAMUSCULAR; INTRAVENOUS
Status: COMPLETED
Start: 2019-04-03 | End: 2019-04-03

## 2019-04-03 RX ORDER — CEFAZOLIN SODIUM 2 G/50ML
2 SOLUTION INTRAVENOUS ONCE
Status: COMPLETED | OUTPATIENT
Start: 2019-04-03 | End: 2019-04-03

## 2019-04-03 RX ORDER — MORPHINE SULFATE 1 MG/ML
INJECTION, SOLUTION EPIDURAL; INTRATHECAL; INTRAVENOUS AS NEEDED
Status: DISCONTINUED | OUTPATIENT
Start: 2019-04-03 | End: 2019-04-03 | Stop reason: SURG

## 2019-04-03 RX ORDER — LIDOCAINE HYDROCHLORIDE 10 MG/ML
0.5 INJECTION, SOLUTION EPIDURAL; INFILTRATION; INTRACAUDAL; PERINEURAL ONCE AS NEEDED
Status: DISCONTINUED | OUTPATIENT
Start: 2019-04-03 | End: 2019-04-03 | Stop reason: HOSPADM

## 2019-04-03 RX ORDER — SUCCINYLCHOLINE CHLORIDE 20 MG/ML
INJECTION INTRAMUSCULAR; INTRAVENOUS AS NEEDED
Status: DISCONTINUED | OUTPATIENT
Start: 2019-04-03 | End: 2019-04-03 | Stop reason: SURG

## 2019-04-03 RX ORDER — SODIUM CHLORIDE 9 MG/ML
40 INJECTION, SOLUTION INTRAVENOUS AS NEEDED
Status: DISCONTINUED | OUTPATIENT
Start: 2019-04-03 | End: 2019-04-03 | Stop reason: HOSPADM

## 2019-04-03 RX ORDER — BUPIVACAINE HYDROCHLORIDE 5 MG/ML
INJECTION, SOLUTION PERINEURAL
Status: COMPLETED | OUTPATIENT
Start: 2019-04-03 | End: 2019-04-03

## 2019-04-03 RX ORDER — IBUPROFEN 800 MG/1
800 TABLET ORAL 3 TIMES DAILY PRN
Status: DISCONTINUED | OUTPATIENT
Start: 2019-04-04 | End: 2019-04-05 | Stop reason: HOSPADM

## 2019-04-03 RX ORDER — PROPOFOL 10 MG/ML
VIAL (ML) INTRAVENOUS AS NEEDED
Status: DISCONTINUED | OUTPATIENT
Start: 2019-04-03 | End: 2019-04-03 | Stop reason: SURG

## 2019-04-03 RX ORDER — SCOLOPAMINE TRANSDERMAL SYSTEM 1 MG/1
1 PATCH, EXTENDED RELEASE TRANSDERMAL CONTINUOUS
Status: ACTIVE | OUTPATIENT
Start: 2019-04-03 | End: 2019-04-04

## 2019-04-03 RX ORDER — FENTANYL CITRATE 50 UG/ML
INJECTION, SOLUTION INTRAMUSCULAR; INTRAVENOUS AS NEEDED
Status: DISCONTINUED | OUTPATIENT
Start: 2019-04-03 | End: 2019-04-03 | Stop reason: SURG

## 2019-04-03 RX ORDER — LIDOCAINE HYDROCHLORIDE 20 MG/ML
INJECTION, SOLUTION INFILTRATION; PERINEURAL AS NEEDED
Status: DISCONTINUED | OUTPATIENT
Start: 2019-04-03 | End: 2019-04-03 | Stop reason: SURG

## 2019-04-03 RX ORDER — SODIUM CHLORIDE, SODIUM LACTATE, POTASSIUM CHLORIDE, CALCIUM CHLORIDE 600; 310; 30; 20 MG/100ML; MG/100ML; MG/100ML; MG/100ML
9 INJECTION, SOLUTION INTRAVENOUS CONTINUOUS
Status: DISCONTINUED | OUTPATIENT
Start: 2019-04-03 | End: 2019-04-03

## 2019-04-03 RX ORDER — OXYCODONE AND ACETAMINOPHEN 10; 325 MG/1; MG/1
1 TABLET ORAL EVERY 4 HOURS PRN
Status: DISCONTINUED | OUTPATIENT
Start: 2019-04-04 | End: 2019-04-05 | Stop reason: HOSPADM

## 2019-04-03 RX ORDER — FAMOTIDINE 20 MG/1
20 TABLET, FILM COATED ORAL
Status: COMPLETED | OUTPATIENT
Start: 2019-04-03 | End: 2019-04-03

## 2019-04-03 RX ORDER — IRON POLYSACCHARIDE COMPLEX 150 MG
150 CAPSULE ORAL DAILY
Status: DISCONTINUED | OUTPATIENT
Start: 2019-04-03 | End: 2019-04-05 | Stop reason: HOSPADM

## 2019-04-03 RX ORDER — ROCURONIUM BROMIDE 10 MG/ML
INJECTION, SOLUTION INTRAVENOUS AS NEEDED
Status: DISCONTINUED | OUTPATIENT
Start: 2019-04-03 | End: 2019-04-03 | Stop reason: SURG

## 2019-04-03 RX ORDER — GLYCOPYRROLATE 0.2 MG/ML
0.2 INJECTION INTRAMUSCULAR; INTRAVENOUS
Status: COMPLETED | OUTPATIENT
Start: 2019-04-03 | End: 2019-04-03

## 2019-04-03 RX ORDER — KETOROLAC TROMETHAMINE 30 MG/ML
30 INJECTION, SOLUTION INTRAMUSCULAR; INTRAVENOUS EVERY 6 HOURS PRN
Status: DISPENSED | OUTPATIENT
Start: 2019-04-03 | End: 2019-04-04

## 2019-04-03 RX ORDER — MIDAZOLAM HYDROCHLORIDE 1 MG/ML
2 INJECTION INTRAMUSCULAR; INTRAVENOUS
Status: DISCONTINUED | OUTPATIENT
Start: 2019-04-03 | End: 2019-04-03 | Stop reason: HOSPADM

## 2019-04-03 RX ORDER — PROMETHAZINE HYDROCHLORIDE 25 MG/1
25 TABLET ORAL ONCE AS NEEDED
Status: DISCONTINUED | OUTPATIENT
Start: 2019-04-03 | End: 2019-04-03 | Stop reason: HOSPADM

## 2019-04-03 RX ORDER — MORPHINE SULFATE 2 MG/ML
2 INJECTION, SOLUTION INTRAMUSCULAR; INTRAVENOUS
Status: ACTIVE | OUTPATIENT
Start: 2019-04-03 | End: 2019-04-04

## 2019-04-03 RX ORDER — KETAMINE HYDROCHLORIDE 100 MG/ML
INJECTION INTRAMUSCULAR; INTRAVENOUS AS NEEDED
Status: DISCONTINUED | OUTPATIENT
Start: 2019-04-03 | End: 2019-04-03 | Stop reason: SURG

## 2019-04-03 RX ORDER — DIPHENHYDRAMINE HYDROCHLORIDE 50 MG/ML
12.5 INJECTION INTRAMUSCULAR; INTRAVENOUS
Status: DISCONTINUED | OUTPATIENT
Start: 2019-04-03 | End: 2019-04-03 | Stop reason: HOSPADM

## 2019-04-03 RX ORDER — OXYCODONE HYDROCHLORIDE AND ACETAMINOPHEN 5; 325 MG/1; MG/1
1 TABLET ORAL EVERY 4 HOURS PRN
Status: DISPENSED | OUTPATIENT
Start: 2019-04-03 | End: 2019-04-04

## 2019-04-03 RX ORDER — ONDANSETRON 2 MG/ML
4 INJECTION INTRAMUSCULAR; INTRAVENOUS ONCE AS NEEDED
Status: DISPENSED | OUTPATIENT
Start: 2019-04-03 | End: 2019-04-04

## 2019-04-03 RX ADMIN — GLYCOPYRROLATE 0.4 MG: 0.2 INJECTION INTRAMUSCULAR; INTRAVENOUS at 10:54

## 2019-04-03 RX ADMIN — ROCURONIUM BROMIDE 25 MG: 10 INJECTION INTRAVENOUS at 09:45

## 2019-04-03 RX ADMIN — KETAMINE HYDROCHLORIDE 40 MG: 100 INJECTION INTRAMUSCULAR; INTRAVENOUS at 09:35

## 2019-04-03 RX ADMIN — KETOROLAC TROMETHAMINE 30 MG: 30 INJECTION, SOLUTION INTRAMUSCULAR at 23:13

## 2019-04-03 RX ADMIN — SODIUM CHLORIDE, POTASSIUM CHLORIDE, SODIUM LACTATE AND CALCIUM CHLORIDE 9 ML/HR: 600; 310; 30; 20 INJECTION, SOLUTION INTRAVENOUS at 08:03

## 2019-04-03 RX ADMIN — SCOPALAMINE 1 PATCH: 1 PATCH, EXTENDED RELEASE TRANSDERMAL at 08:19

## 2019-04-03 RX ADMIN — ONDANSETRON 4 MG: 2 INJECTION, SOLUTION INTRAMUSCULAR; INTRAVENOUS at 08:19

## 2019-04-03 RX ADMIN — SIMETHICONE 40 MG: 20 SUSPENSION/ DROPS ORAL at 20:55

## 2019-04-03 RX ADMIN — FAMOTIDINE 20 MG: 10 INJECTION, SOLUTION INTRAVENOUS at 08:19

## 2019-04-03 RX ADMIN — LIDOCAINE HYDROCHLORIDE 100 MG: 20 INJECTION, SOLUTION INFILTRATION; PERINEURAL at 09:27

## 2019-04-03 RX ADMIN — KETOROLAC TROMETHAMINE 60 MG: 30 INJECTION INTRAMUSCULAR; INTRAVENOUS at 10:51

## 2019-04-03 RX ADMIN — PROPOFOL 200 MG: 10 INJECTION, EMULSION INTRAVENOUS at 09:28

## 2019-04-03 RX ADMIN — MIDAZOLAM HYDROCHLORIDE 2 MG: 1 INJECTION, SOLUTION INTRAMUSCULAR; INTRAVENOUS at 08:56

## 2019-04-03 RX ADMIN — ROCURONIUM BROMIDE 20 MG: 10 INJECTION INTRAVENOUS at 09:53

## 2019-04-03 RX ADMIN — HYDROMORPHONE HYDROCHLORIDE 1 MG: 1 INJECTION, SOLUTION INTRAMUSCULAR; INTRAVENOUS; SUBCUTANEOUS at 12:12

## 2019-04-03 RX ADMIN — NEOSTIGMINE METHYLSULFATE 2 MG: 1 INJECTION, SOLUTION INTRAMUSCULAR; INTRAVENOUS; SUBCUTANEOUS at 10:54

## 2019-04-03 RX ADMIN — KETOROLAC TROMETHAMINE 30 MG: 30 INJECTION, SOLUTION INTRAMUSCULAR at 16:57

## 2019-04-03 RX ADMIN — FENTANYL CITRATE 50 MCG: 50 INJECTION, SOLUTION INTRAMUSCULAR; INTRAVENOUS at 10:00

## 2019-04-03 RX ADMIN — DIPHENHYDRAMINE HYDROCHLORIDE 25 MG: 50 INJECTION INTRAMUSCULAR; INTRAVENOUS at 13:30

## 2019-04-03 RX ADMIN — SODIUM CHLORIDE 40 ML: 9 INJECTION, SOLUTION INTRAVENOUS at 00:00

## 2019-04-03 RX ADMIN — KETAMINE HYDROCHLORIDE 40 MG: 100 INJECTION INTRAMUSCULAR; INTRAVENOUS at 09:28

## 2019-04-03 RX ADMIN — Medication 150 MG: at 16:56

## 2019-04-03 RX ADMIN — CEFAZOLIN SODIUM 2 G: 2 SOLUTION INTRAVENOUS at 09:25

## 2019-04-03 RX ADMIN — SODIUM CHLORIDE 40 ML: 900 INJECTION, SOLUTION INTRAVENOUS at 00:00

## 2019-04-03 RX ADMIN — DEXAMETHASONE SODIUM PHOSPHATE 8 MG: 4 INJECTION, SOLUTION INTRAMUSCULAR; INTRAVENOUS at 08:19

## 2019-04-03 RX ADMIN — OXYCODONE HYDROCHLORIDE AND ACETAMINOPHEN 1 TABLET: 5; 325 TABLET ORAL at 16:57

## 2019-04-03 RX ADMIN — KETAMINE HYDROCHLORIDE 20 MG: 100 INJECTION INTRAMUSCULAR; INTRAVENOUS at 10:45

## 2019-04-03 RX ADMIN — BUPIVACAINE HYDROCHLORIDE 2 ML: 5 INJECTION, SOLUTION PERINEURAL at 09:12

## 2019-04-03 RX ADMIN — SODIUM CHLORIDE, POTASSIUM CHLORIDE, SODIUM LACTATE AND CALCIUM CHLORIDE 100 ML/HR: 600; 310; 30; 20 INJECTION, SOLUTION INTRAVENOUS at 16:56

## 2019-04-03 RX ADMIN — SODIUM CHLORIDE, POTASSIUM CHLORIDE, SODIUM LACTATE AND CALCIUM CHLORIDE: 600; 310; 30; 20 INJECTION, SOLUTION INTRAVENOUS at 10:55

## 2019-04-03 RX ADMIN — ROCURONIUM BROMIDE 5 MG: 10 INJECTION INTRAVENOUS at 09:27

## 2019-04-03 RX ADMIN — MORPHINE SULFATE 200 MCG: 1 INJECTION, SOLUTION EPIDURAL; INTRATHECAL; INTRAVENOUS at 09:12

## 2019-04-03 RX ADMIN — GLYCOPYRROLATE 0.2 MG: 0.2 INJECTION INTRAMUSCULAR; INTRAVENOUS at 08:19

## 2019-04-03 RX ADMIN — SUCCINYLCHOLINE CHLORIDE 140 MG: 20 INJECTION, SOLUTION INTRAMUSCULAR; INTRAVENOUS at 09:28

## 2019-04-03 NOTE — PLAN OF CARE
Problem: Patient Care Overview  Goal: Plan of Care Review  Outcome: Ongoing (interventions implemented as appropriate)   04/03/19 0359   Coping/Psychosocial   Plan of Care Reviewed With patient   Plan of Care Review   Progress no change   OTHER   Outcome Summary Pt rec'd 2 units of PRBC's, CBC & hcg to be drawn this am, preop procedures explained to pt. Pt had high level of anxiety upon arrival, which improved during the shift.      Goal: Individualization and Mutuality  Outcome: Ongoing (interventions implemented as appropriate)   04/03/19 0359   Mutuality/Individual Preferences   What Anxieties, Fears, Concerns, or Questions Do You Have About Your Care? blood transfusion, hemaglobin levels, and surgery    How Would You and/or Your Support Person Like to Participate in Your Care? SO and daughter to remain in room with pt.    Mutuality/Individual Preferences   How to Address Anxieties/Fears reassurance, education, and explanation of procedures   Individualization   Patient Specific Goals (Include Timeframe) Maintain adequate pain control   Patient Specific Interventions alert nursing staff for unacceptable pain level     Goal: Discharge Needs Assessment  Outcome: Ongoing (interventions implemented as appropriate)   04/03/19 0359   Discharge Needs Assessment   Readmission Within the Last 30 Days no previous admission in last 30 days   Concerns to be Addressed no discharge needs identified   Patient/Family Anticipates Transition to home;home with family   Patient/Family Anticipated Services at Transition none   Transportation Concerns car, none   Transportation Anticipated family or friend will provide   Anticipated Changes Related to Illness none   Equipment Needed After Discharge none   Disability   Equipment Currently Used at Home none     Goal: Interprofessional Rounds/Family Conf  Outcome: Ongoing (interventions implemented as appropriate)      Problem: Anemia (Adult)  Goal: Identify Related Risk Factors and  Signs and Symptoms  Outcome: Ongoing (interventions implemented as appropriate)   04/03/19 0359   Anemia (Adult)   Related Risk Factors (Anemia) bleeding   Signs and Symptoms (Anemia) pallor     Goal: Symptom Improvement  Outcome: Ongoing (interventions implemented as appropriate)   04/03/19 0359   Anemia (Adult)   Symptom Improvement making progress toward outcome

## 2019-04-03 NOTE — ANESTHESIA PROCEDURE NOTES
Spinal Block    Pre-sedation assessment completed: 4/3/2019 9:00 AM    Patient reassessed immediately prior to procedure    Patient location during procedure: pre-op  Start Time: 4/3/2019 9:02 AM  Stop Time: 4/3/2019 9:13 AM  Indication:at surgeon's request and post-op pain management  Preanesthetic Checklist  Completed: patient identified, site marked, surgical consent, pre-op evaluation, timeout performed, IV checked, risks and benefits discussed and monitors and equipment checked  Spinal Block Prep:  Patient Position:sitting  Sterile Tech:cap, gloves, mask and sterile barriers  Prep:Betadine  Patient Monitoring:blood pressure monitoring, continuous pulse oximetry and EKG  Spinal Block Procedure  Approach:midline  Guidance:landmark technique  Location:L2-L3  Needle Type:Sprotte  Needle Gauge:24 G  Placement of Spinal needle event:cerebrospinal fluid aspirated  Paresthesia: no  Fluid Appearance:clear  Medications: bupivacaine (MARCAINE) injection 0.5%, 2 mL  Med Administered at 4/3/2019 9:12 AM   Post Assessment  Patient Tolerance:patient tolerated the procedure well with no apparent complications  Complications no

## 2019-04-03 NOTE — ANESTHESIA PREPROCEDURE EVALUATION
Anesthesia Evaluation     Patient summary reviewed and Nursing notes reviewed   no history of anesthetic complications:  NPO Solid Status: > 8 hours  NPO Liquid Status: > 8 hours           Airway   Mallampati: II  TM distance: >3 FB  Neck ROM: full  Possible difficult intubation and Small opening  Dental - normal exam     Pulmonary - normal exam   (+) asthma (as child),     ROS comment: snore  Cardiovascular - negative cardio ROS and normal exam    Rhythm: regular  Rate: normal        Neuro/Psych  (+) psychiatric history Anxiety,     GI/Hepatic/Renal/Endo    (+) obesity,       Musculoskeletal (-) negative ROS    Abdominal   (+) obese,    Substance History - negative use     OB/GYN      Comment: Menometrorrhagia      Other - negative ROS                       Anesthesia Plan    ASA 2     general and ITN     intravenous induction   Anesthetic plan, all risks, benefits, and alternatives have been provided, discussed and informed consent has been obtained with: patient.  Use of blood products discussed with patient  Consented to blood products.

## 2019-04-03 NOTE — PLAN OF CARE
Problem: Patient Care Overview  Goal: Plan of Care Review  Outcome: Ongoing (interventions implemented as appropriate)   04/03/19 1927   Coping/Psychosocial   Plan of Care Reviewed With patient;spouse   Plan of Care Review   Progress improving   OTHER   Outcome Summary Vitals wnl. Adam patent draining via gravity. Bowel sounds hypoactive. Pt pain controlled with po/iv pain medications. Cont to monitor pt status. Update MD as needed with changes.     Goal: Individualization and Mutuality  Outcome: Ongoing (interventions implemented as appropriate)    Goal: Discharge Needs Assessment  Outcome: Ongoing (interventions implemented as appropriate)   04/03/19 1927   Discharge Needs Assessment   Readmission Within the Last 30 Days no previous admission in last 30 days   Concerns to be Addressed no discharge needs identified   Patient/Family Anticipates Transition to home   Patient/Family Anticipated Services at Transition none   Transportation Concerns car, none   Transportation Anticipated family or friend will provide   Anticipated Changes Related to Illness none   Equipment Needed After Discharge none   Disability   Equipment Currently Used at Home none       Problem: Anemia (Adult)  Goal: Identify Related Risk Factors and Signs and Symptoms  Outcome: Ongoing (interventions implemented as appropriate)   04/03/19 1927   Anemia (Adult)   Related Risk Factors (Anemia) bleeding   Signs and Symptoms (Anemia) pallor     Goal: Symptom Improvement  Outcome: Ongoing (interventions implemented as appropriate)   04/03/19 1927   Anemia (Adult)   Symptom Improvement making progress toward outcome       Problem: Hysterectomy (Adult)  Goal: Signs and Symptoms of Listed Potential Problems Will be Absent, Minimized or Managed (Hysterectomy)  Outcome: Ongoing (interventions implemented as appropriate)   04/03/19 1927   Goal/Outcome Evaluation   Problems Assessed (Hysterectomy) all   Problems Present (Hysterectomy) none     Goal:  Anesthesia/Sedation Recovery  Outcome: Ongoing (interventions implemented as appropriate)   04/03/19 8435   Goal/Outcome Evaluation   Anesthesia/Sedation Recovery progressing toward baseline

## 2019-04-03 NOTE — ANESTHESIA PROCEDURE NOTES
Airway  Urgency: elective    Date/Time: 4/3/2019 9:31 AM    General Information and Staff    Patient location during procedure: OR    Indications and Patient Condition  Indications for airway management: airway protection    Preoxygenated: yes  MILS maintained throughout  Mask difficulty assessment: 1 - vent by mask    Final Airway Details  Final airway type: endotracheal airway      Successful airway: ETT  Cuffed: yes   Successful intubation technique: direct laryngoscopy  Facilitating devices/methods: intubating stylet  Endotracheal tube insertion site: oral  Blade: Castillo  Blade size: 3  ETT size (mm): 7.5  Cormack-Lehane Classification: grade I - full view of glottis  Placement verified by: chest auscultation   Measured from: lips  ETT to lips (cm): 21  Number of attempts at approach: 1

## 2019-04-03 NOTE — OP NOTE
Subjective     Date of Service:  04/03/19  Time of Service:  11:13 AM    Surgical Staff: Surgeon(s) and Role:     * Rosemarie Garay DO - Primary   Additional Staff: HOME Ball   Pre-operative diagnosis(es): Pre-Op Diagnosis Codes:     * Menometrorrhagia [N92.1]     Post-operative diagnosis(es): Post-Op Diagnosis Codes:     * Menometrorrhagia [N92.1]   Procedure(s): Procedure(s):  diagnostic laparoscopy,  Exploratory laparotomy, total abdominal hysterectomy with bilateral salpingectomy.     Antibiotics: cefazolin (Ancef) ordered on call to OR     Anesthesia: Type: Choice  ASA:  II     Objective      Operative findings: Enlarged fibroid uterus. Normal ovaries and fallopian tubes   Specimens removed: ID Type Source Tests Collected by Time   A : uterus, cervix, bilateral fallopian tubes Tissue Uterus, Cervix, Bilateral Fallopian Tubes  TISSUE PATHOLOGY EXAM Rosemarie Garay DO 4/3/2019 1101      Fluid Intake: 1000mL   Output: Documented Output  Est. Blood Loss 150mL  Urine Output 200mL      I/O this shift:  In: 1000 [I.V.:1000]  Out: 350 [Urine:200; Blood:150]     Blood products used: No   Drains: NG/OG Tube Orogastric 18 Fr Center mouth (Active)       Urethral Catheter (Active)      Implant Information: Nothing was implanted during the procedure   Complications: None apparent   Intraoperative consult(s):    Condition: stable   Disposition: to PACU and then admit to  medical - surgical floor         Assessment/Plan     39yo with menometrorrhagia, iron def anemia and large fibroid uterus presents for TLH/BS versus possible Xlap KIRK.  Pt with extremely heavy menstrual cycles.  Patient will bleed heavily and up to 2 weeks.  Etiology of her bleeding is most likely related to her large fibroid uterus measuring 8 x 8 cm on transvaginal ultrasound.  Ovaries were noted to be normal bilaterally.  Patient's bleeding is so heavy that she is anemic.  Her hemoglobin was noted to be 7.7.  Patient is currently is on iron.  A long  discussion was had with patient concerning her chronic anemia due to her abnormal uterine bleeding.  Patient was offered a hysterectomy to treat her abnormal uterine bleeding and anemia.  Patient is finished with childbearing.  The fibroid measuring 8 x 8 cm may limit a laparoscopic hysterectomy.  I reviewed a laparoscopic hysterectomy as well as an exploratory laparotomy with abdominal hysterectomy with the patient.  We discussed the pre-intra-and post procedure and all questions were answered.  The risks of the procedure were reviewed including risk of bleeding and blood transfusion, risk of infection, damage to bowel and bladder, damage to ureters, and risk of DVT.  Due to pt's hgb of 7.7 she was admitted last night and received 2 units of PRBCs. Her hgb this am is 9.5.    After all questions were answered, patient received intrathecal narcotics prior to proceeding to the operating room.  When she was in the operating room general anesthesia with an endotracheal tube was placed.  The patient was placed in the dorsolithotomy position and prepped and draped in normal sterile fashion.  Patient received IV antibiotics for ID prophylaxis.  SCDs were on the lower extremities bilaterally for DVT prophylaxis.  A Adam catheter was placed in the bladder and hung to gravity for the duration of the procedure.  Due to the size of the uterus decision was made to perform diagnostic laparoscopy to determine if patient was a candidate for total laparoscopic hysterectomy.  A small infraumbilical incision was made and a 5 mm trocar was placed without difficulty.  The abdomen was insufflated with CO2 gas and patient was placed in Trendelenburg position.  A dilator was placed into the cervix to manipulate the uterus.  Inspection of the uterus revealed it to be a very large fibroid uterus.  The width of the uterus appeared to be between 12 and 14 cm.  The ovaries and the fallopian tubes were noted to be normal.  The decision was made  to proceed with exploratory laparotomy due to the size of the uterus and limitations of visualization as well as limitations with removing through the vagina.  The diagnostic scope was removed as was the trocar.  The CO2 gas was released.  The skin incision was reapproximated with 4-0 Vicryl.  The patient was then placed in a supine position and using the scalpel, a Pfannenstiel incision was made.  The Pfannenstiel skin incision was carried down to the fascia.  The fascia was incised and carried out laterally with pickups and Granado scissors.  Renetta clamps were used to grasp the superior and inferior aspects of the fascial incision.  The fascia was tented up and the rectus abdominal muscles were bluntly taken down.  The rectus abdominal muscles were then  in the midline the peritoneum was bluntly entered and extended.  Palpation of the uterus again revealed the uterus to be a very large fibroid uterus.  The fallopian tubes and ovaries were noted to be normal bilaterally.  The patient's right fallopian tube was grasped with Katelyn clamp.  Using Bovie cautery as well as suture salpingectomy was performed.  The right round ligament was then grasped with Renetta clamp and  with Bovie cautery.  0 Vicryl was then used to suture ligate the round ligament.  The anterior leaf of the broad ligament was entered with pickups and Metzenbaum scissors.  This was brought down to the lower uterine segment where a bladder flap was created without difficulty.  The tubo-ovarian vessels were then grasped with a Johnny clamp.  They were transected with a sharp scalpel and then suture ligated x2 with 0 Vicryl suture.  The right uterine artery was then skeletonized.  It was grasped with a curved Johnny clamp and then transected with a scalpel.  The uterine artery was suture ligated with 0 Vicryl suture with hemostasis noted.  Attention was then turned to the left side of the pelvis.  The left fallopian tubes was grasped with  a Lomita clamp using Bovie cautery and suture the left fallopian tube was removed.  The round ligament was grasped with Jarratt clamp and transected.  The round ligament was then suture ligated with 0 Vicryl suture.  The anterior leaves of the broad ligament was then entered on the side and carried down to the lower uterine segment where the bladder was completely taken off the lower uterine segment.  The left tubo-ovarian vessels were then grasped with a Johnny clamp.  The tubo-ovarian vessels were transected and suture ligated with 0 Vicryl suture.  The left uterine artery was then skeletonized.  It was grasped with a Johnny clamp and then transected with a sharp scalpel.  The left uterine artery was suture ligated with 0 Vicryl suture.  Palpation of the cervix revealed it to be in close proximity to where the uterine arteries were located.  Curved Johnny clamps were used to grasp the uterosacral ligaments bilaterally.  Deisi scissors were then used to remove the entire specimen.  The uterus and the cervix were removed and sent to pathology.  Figure-of-eight sutures were then used to reapproximate the vaginal cuff.  Copious irrigation of the pelvis revealed hemostasis.  All the pedicles were noted to be hemostatic.  The ureters were visualized bilaterally with peristalsis.  At this point the procedure was deemed over.  All instruments removed from the patient's abdomen including the wet laps and the [place] retractor which had been placed.  The fascia was then reapproximated with 0 Vicryl in a running fashion.  The subcutaneous layer was irrigated and cauterized as needed for hemostasis.  The skin incision was reapproximated with 4-0 Vicryl in some particular fashion.  Patient tolerated procedure well.  There were no apparent complications.  Patient is currently resting in stable condition in postanesthesia recovery.

## 2019-04-03 NOTE — H&P
Patient Care Team:  Provider, No Known as PCP - General    Chief complaint menometrorrhagia       HPI: 39yo with menometrorrhagia, iron def anemia and large fibroid uterus presents for TLH/BS versus possible Xlap KIRK.  Pt with extremely heavy menstrual cycles.  Patient will bleed heavily and up to 2 weeks.  Etiology of her bleeding is most likely related to her large fibroid uterus measuring 8 x 8 cm on transvaginal ultrasound.  Ovaries were noted to be normal bilaterally.  Patient's bleeding is so heavy that she is anemic.  Her hemoglobin was noted to be 7.7.  Patient is currently is on iron.  A long discussion was had with patient concerning her chronic anemia due to her abnormal uterine bleeding.  Patient was offered a hysterectomy to treat her abnormal uterine bleeding and anemia.  Patient is finished with childbearing.  The fibroid measuring 8 x 8 cm may limit a laparoscopic hysterectomy.  I reviewed a laparoscopic hysterectomy as well as an exploratory laparotomy with abdominal hysterectomy with the patient.  We discussed the pre-intra-and post procedure and all questions were answered.  The risks of the procedure were reviewed including risk of bleeding and blood transfusion, risk of infection, damage to bowel and bladder, damage to ureters, and risk of DVT.  Due to pt's hgb of 7.7 she was admitted last night and received 2 units of PRBCs. Her hgb this am is 9.5.        Debilities: None    Emotional Behavior: Appropriate    PMH:   Past Medical History:   Diagnosis Date   • Anemia    • Anxiety    • Fibroids     sched TLH   • History of asthma     childhood and anxiety related   • History of migraine          PSH:   Past Surgical History:   Procedure Laterality Date   • NO PAST SURGERIES         SoHx:   Social History     Socioeconomic History   • Marital status: Single     Spouse name: Not on file   • Number of children: Not on file   • Years of education: Not on file   • Highest education level: Not on  file   Tobacco Use   • Smoking status: Never Smoker   • Smokeless tobacco: Never Used   Substance and Sexual Activity   • Alcohol use: No     Frequency: Never   • Drug use: No   • Sexual activity: Defer       FHx:   Family History   Problem Relation Age of Onset   • Diabetes Mother    • Hypertension Mother    • Heart disease Mother         pacemaker   • Aneurysm Maternal Aunt    • Hypertension Maternal Grandmother    • Aneurysm Maternal Grandmother    • Hypertension Maternal Grandfather        PGyn Hx: UTD    POBHx:     Allergies: Azithromycin; Levaquin [levofloxacin]; and Prednisone    Medications:   No current facility-administered medications on file prior to encounter.      Current Outpatient Medications on File Prior to Encounter   Medication Sig Dispense Refill   • iron polysaccharides (NIFEREX) 150 MG capsule Take 150 mg by mouth 2 (Two) Times a Day.     • vitamin B-12 (CYANOCOBALAMIN) 100 MCG tablet Take 50 mcg by mouth Daily.               Vital Signs  Temp:  [97.7 °F (36.5 °C)-98.6 °F (37 °C)] 97.9 °F (36.6 °C)  Heart Rate:  [] 96  Resp:  [16-18] 18  BP: ()/(49-88) 128/85    Physical Exam:  General: NAD  Heart:: RRR  Lungs: clear  Abdomen: soft, NT/ND  Genitalia: deferred to OR  Extremities: no calf tenderness  Psychological: AAOx3      Labs: hgb 9.5      Assessment/Plan: 39yo with menometrorrhagia, anemia and fibroid uterus        I discussed the patients findings and my recommendations with patient and family.     Rosemarie Garay DO  19  9:06 AM

## 2019-04-03 NOTE — NURSING NOTE
Pt transported to pre-op via bed escorted by Chelsea (pre-op staff), significant other, and daughter.

## 2019-04-03 NOTE — ANESTHESIA POSTPROCEDURE EVALUATION
Patient: April Stapleton    Procedure Summary     Date:  04/03/19 Room / Location:   [facility] 3 /  [facility]    Anesthesia Start:  0920 Anesthesia Stop:  1123    Procedure:  diagnosti laparoscopy,  Exploratory laparotomy, total abdominal hysterectomy with bilateral salpingectomy. (N/A Abdomen) Diagnosis:       Menometrorrhagia      (Menometrorrhagia [N92.1])    Surgeon:  Rosemarie Garay DO Provider:  Naveed Tian CRNA    Anesthesia Type:  general, ITN ASA Status:  2          Anesthesia Type: general, ITN  Last vitals  BP   108/77 (04/03/19 1212)   Temp   97.7 °F (36.5 °C) (04/03/19 1120)   Pulse   95 (04/03/19 1212)   Resp   14 (04/03/19 1120)     SpO2   100 % (04/03/19 1212)     Post Anesthesia Care and Evaluation    Patient location during evaluation: PACU  Patient participation: complete - patient participated  Level of consciousness: awake  Pain score: 1  Pain management: adequate  Airway patency: patent  Anesthetic complications: No anesthetic complications  PONV Status: none  Cardiovascular status: acceptable  Respiratory status: acceptable  Hydration status: acceptable  Post Neuraxial Block status: No signs or symptoms of PDPH

## 2019-04-04 LAB
CYTO UR: NORMAL
DEPRECATED RDW RBC AUTO: 57.5 FL (ref 37–54)
ERYTHROCYTE [DISTWIDTH] IN BLOOD BY AUTOMATED COUNT: 20.7 % (ref 12.3–15.4)
HCT VFR BLD AUTO: 30.5 % (ref 34–46.6)
HGB BLD-MCNC: 8.5 G/DL (ref 12–15.9)
LAB AP CASE REPORT: NORMAL
MCH RBC QN AUTO: 21.5 PG (ref 26.6–33)
MCHC RBC AUTO-ENTMCNC: 27.9 G/DL (ref 31.5–35.7)
MCV RBC AUTO: 77.2 FL (ref 79–97)
PATH REPORT.FINAL DX SPEC: NORMAL
PATH REPORT.GROSS SPEC: NORMAL
PLATELET # BLD AUTO: 295 10*3/MM3 (ref 140–450)
PMV BLD AUTO: 8.7 FL (ref 6–12)
RBC # BLD AUTO: 3.95 10*6/MM3 (ref 3.77–5.28)
WBC NRBC COR # BLD: 9.57 10*3/MM3 (ref 3.4–10.8)

## 2019-04-04 PROCEDURE — 85027 COMPLETE CBC AUTOMATED: CPT | Performed by: OBSTETRICS & GYNECOLOGY

## 2019-04-04 PROCEDURE — 25010000002 KETOROLAC TROMETHAMINE PER 15 MG: Performed by: NURSE ANESTHETIST, CERTIFIED REGISTERED

## 2019-04-04 RX ORDER — ONDANSETRON 4 MG/1
TABLET, FILM COATED ORAL
Status: COMPLETED
Start: 2019-04-04 | End: 2019-04-04

## 2019-04-04 RX ORDER — ONDANSETRON 4 MG/1
4 TABLET, FILM COATED ORAL EVERY 6 HOURS PRN
Status: DISCONTINUED | OUTPATIENT
Start: 2019-04-04 | End: 2019-04-05 | Stop reason: HOSPADM

## 2019-04-04 RX ADMIN — SODIUM CHLORIDE, POTASSIUM CHLORIDE, SODIUM LACTATE AND CALCIUM CHLORIDE 100 ML/HR: 600; 310; 30; 20 INJECTION, SOLUTION INTRAVENOUS at 01:45

## 2019-04-04 RX ADMIN — SIMETHICONE 80 MG: 80 TABLET, CHEWABLE ORAL at 18:14

## 2019-04-04 RX ADMIN — OXYCODONE HYDROCHLORIDE AND ACETAMINOPHEN 1 TABLET: 5; 325 TABLET ORAL at 20:22

## 2019-04-04 RX ADMIN — IBUPROFEN 800 MG: 800 TABLET ORAL at 12:54

## 2019-04-04 RX ADMIN — ONDANSETRON 4 MG: 4 TABLET, FILM COATED ORAL at 20:22

## 2019-04-04 RX ADMIN — Medication 150 MG: at 08:32

## 2019-04-04 RX ADMIN — KETOROLAC TROMETHAMINE 30 MG: 30 INJECTION, SOLUTION INTRAMUSCULAR at 06:03

## 2019-04-04 NOTE — PROGRESS NOTES
Patient: April Stapleton  Procedure(s):  diagnosti laparoscopy,  Exploratory laparotomy, total abdominal hysterectomy with bilateral salpingectomy.  Anesthesia type: [unfilled]    Patient location: Labor and Delivery  Vitals:    04/04/19 0504 04/04/19 0534 04/04/19 0540 04/04/19 0830   BP:   98/40 106/69   Pulse: 67 74 75 68   Resp:   18 16   Temp:   98 °F (36.7 °C) 98 °F (36.7 °C)   TempSrc:   Oral Oral   SpO2: 98% 96%  96%   Weight:       Height:         Level of consciousness: awake, alert and oriented    Post-anesthesia pain: adequate analgesia  Airway patency: patent  Respiratory: unassisted  Cardiovascular: stable and blood pressure at baseline  Hydration: euvolemic    Anesthetic complications: no

## 2019-04-04 NOTE — PLAN OF CARE
Problem: Patient Care Overview  Goal: Plan of Care Review   04/04/19 0324   Coping/Psychosocial   Plan of Care Reviewed With family   Plan of Care Review   Progress improving     Goal: Individualization and Mutuality  Outcome: Ongoing (interventions implemented as appropriate)   04/04/19 0324   Mutuality/Individual Preferences   What Anxieties, Fears, Concerns, or Questions Do You Have About Your Care? recovery    Mutuality/Individual Preferences   How to Address Anxieties/Fears education   Individualization   Patient Specific Preferences pain control   Patient Specific Goals (Include Timeframe) pain control   Patient Specific Interventions keeping nurse aware of pain     Goal: Interprofessional Rounds/Family Conf  Outcome: Ongoing (interventions implemented as appropriate)   04/04/19 0324   Interdisciplinary Rounds/Family Conf   Participants family       Problem: Anemia (Adult)  Goal: Symptom Improvement  Outcome: Ongoing (interventions implemented as appropriate)   04/04/19 0324   Anemia (Adult)   Symptom Improvement making progress toward outcome       Problem: Hysterectomy (Adult)  Goal: Anesthesia/Sedation Recovery  Outcome: Ongoing (interventions implemented as appropriate)   04/04/19 0324   Goal/Outcome Evaluation   Anesthesia/Sedation Recovery progressing toward baseline       Problem: Skin Injury Risk (Adult)  Goal: Identify Related Risk Factors and Signs and Symptoms  Outcome: Ongoing (interventions implemented as appropriate)   04/04/19 0324   Skin Injury Risk (Adult)   Related Risk Factors (Skin Injury Risk) infection     Goal: Skin Health and Integrity  Outcome: Ongoing (interventions implemented as appropriate)   04/04/19 0324   Skin Injury Risk (Adult)   Skin Health and Integrity making progress toward outcome

## 2019-04-04 NOTE — PLAN OF CARE
Problem: Patient Care Overview  Goal: Plan of Care Review   04/04/19 1634   Coping/Psychosocial   Plan of Care Reviewed With patient;family   Plan of Care Review   Progress improving   OTHER   Outcome Summary VSS, PAIN CONTROLLED WITH PO MEDICATION, AMBULATE IN HALLS AND ROOM, ADEQUATE URINE OUTPUT      Goal: Individualization and Mutuality   04/04/19 1634   Mutuality/Individual Preferences   What Anxieties, Fears, Concerns, or Questions Do You Have About Your Care? Recovery, Having a bowel movement    How Would You and/or Your Support Person Like to Participate in Your Care? SO and daughter to remain in room with pt   Mutuality/Individual Preferences   How to Address Anxieties/Fears education    Individualization   Patient Specific Preferences Pain controlled via PO medications   Patient Specific Goals (Include Timeframe) Pain conrol    Patient Specific Interventions Keep nurse aware of pain      Goal: Discharge Needs Assessment   04/04/19 1634   Discharge Needs Assessment   Readmission Within the Last 30 Days no previous admission in last 30 days   Concerns to be Addressed no discharge needs identified   Patient/Family Anticipates Transition to home   Patient/Family Anticipated Services at Transition none   Transportation Concerns car, none   Transportation Anticipated family or friend will provide   Anticipated Changes Related to Illness none   Equipment Needed After Discharge none   Disability   Equipment Currently Used at Home none     Goal: Interprofessional Rounds/Family Conf   04/04/19 1634   Interdisciplinary Rounds/Family Conf   Participants family       Problem: Anemia (Adult)  Intervention: Facilitate Safe Activity   04/04/19 1634   Safety Management   Safety Promotion/Fall Prevention nonskid shoes/slippers when out of bed;safety round/check completed     Intervention: Minimize Infection Risk   04/04/19 1634   Safety Management   Infection Prevention visitors restricted/screened     Intervention: Promote  Hydration and Nutrition   04/04/19 1634   Nutrition Interventions   Oral Nutrition Promotion medicated;rest periods promoted       Goal: Identify Related Risk Factors and Signs and Symptoms   04/04/19 1634   Anemia (Adult)   Related Risk Factors (Anemia) bleeding   Signs and Symptoms (Anemia) pallor     Goal: Symptom Improvement   04/04/19 1634   Anemia (Adult)   Symptom Improvement making progress toward outcome       Problem: Hysterectomy (Adult)  Intervention: Support Surgical and Anesthesia Recovery   04/04/19 1634   Safety Management   Safety Promotion/Fall Prevention nonskid shoes/slippers when out of bed;safety round/check completed   Respiratory Interventions   Airway/Ventilation Management airway patency maintained     Intervention: Monitor/Manage Pain   04/04/19 1634   Safety Management   Medication Review/Management medications reviewed   Promote Oxygenation/Perfusion   Pain Management Interventions see MAR     Intervention: Promote Surgical Wound Healing/Prevent Dehiscence   04/04/19 1634   Pain/Comfort/Sleep Interventions   Sleep/Rest Enhancement regular sleep/rest pattern promoted     Intervention: Optimize Psychosocial Response to the Surgical Experience   04/04/19 1634   Interventions   Trust Relationship/Rapport care explained;choices provided;questions answered;questions encouraged       Goal: Signs and Symptoms of Listed Potential Problems Will be Absent, Minimized or Managed (Hysterectomy)   04/04/19 1634   Goal/Outcome Evaluation   Problems Assessed (Hysterectomy) all   Problems Present (Hysterectomy) none     Goal: Anesthesia/Sedation Recovery   04/04/19 1634   Goal/Outcome Evaluation   Anesthesia/Sedation Recovery progressing toward baseline       Problem: Skin Injury Risk (Adult)  Intervention: Promote/Optimize Nutrition   04/04/19 1634   Nutrition Interventions   Oral Nutrition Promotion medicated;rest periods promoted     Intervention: Prevent/Manage Excess Moisture   04/04/19 1634    Hygiene Care   Perineal Care absorbent pad changed     Intervention: Prevent/Minimize Shear/Friction Injuries   04/04/19 1634   Positioning   Positioning/Transfer Devices aBduction splint/pillow       Goal: Identify Related Risk Factors and Signs and Symptoms   04/04/19 1634   Skin Injury Risk (Adult)   Related Risk Factors (Skin Injury Risk) infection     Goal: Skin Health and Integrity   04/04/19 1634   Skin Injury Risk (Adult)   Skin Health and Integrity making progress toward outcome

## 2019-04-05 VITALS
WEIGHT: 236 LBS | RESPIRATION RATE: 20 BRPM | DIASTOLIC BLOOD PRESSURE: 73 MMHG | HEIGHT: 69 IN | HEART RATE: 69 BPM | TEMPERATURE: 98.6 F | SYSTOLIC BLOOD PRESSURE: 106 MMHG | OXYGEN SATURATION: 97 % | BODY MASS INDEX: 34.96 KG/M2

## 2019-04-05 PROBLEM — Z90.710 S/P TAH (TOTAL ABDOMINAL HYSTERECTOMY): Status: ACTIVE | Noted: 2019-04-05

## 2019-04-05 PROCEDURE — 99024 POSTOP FOLLOW-UP VISIT: CPT | Performed by: OBSTETRICS & GYNECOLOGY

## 2019-04-05 RX ORDER — IBUPROFEN 800 MG/1
800 TABLET ORAL 3 TIMES DAILY PRN
Qty: 30 TABLET | Refills: 0 | Status: SHIPPED | OUTPATIENT
Start: 2019-04-05

## 2019-04-05 RX ORDER — POLYETHYLENE GLYCOL 3350 17 G/17G
17 POWDER, FOR SOLUTION ORAL DAILY
Status: DISCONTINUED | OUTPATIENT
Start: 2019-04-05 | End: 2019-04-05 | Stop reason: HOSPADM

## 2019-04-05 RX ORDER — OXYCODONE HYDROCHLORIDE AND ACETAMINOPHEN 5; 325 MG/1; MG/1
1 TABLET ORAL EVERY 4 HOURS PRN
Qty: 30 TABLET | Refills: 0 | Status: SHIPPED | OUTPATIENT
Start: 2019-04-05

## 2019-04-05 RX ORDER — PSEUDOEPHEDRINE HCL 30 MG
100 TABLET ORAL 2 TIMES DAILY PRN
Qty: 60 EACH | Refills: 0 | Status: SHIPPED | OUTPATIENT
Start: 2019-04-05

## 2019-04-05 RX ADMIN — OXYCODONE HYDROCHLORIDE AND ACETAMINOPHEN 1 TABLET: 5; 325 TABLET ORAL at 01:50

## 2019-04-05 RX ADMIN — SIMETHICONE 80 MG: 80 TABLET, CHEWABLE ORAL at 01:50

## 2019-04-05 RX ADMIN — Medication 150 MG: at 08:30

## 2019-04-05 RX ADMIN — DOCUSATE SODIUM 100 MG: 100 CAPSULE, LIQUID FILLED ORAL at 08:29

## 2019-04-05 RX ADMIN — POLYETHYLENE GLYCOL 3350 17 G: 17 POWDER, FOR SOLUTION ORAL at 10:46

## 2019-04-05 NOTE — PROGRESS NOTES
"[facility]    Progress Note    Patient Name: Carolee Stapleton  :  1980  MRN:  [id number]    Chief Complaint   Patient presents with   • blood transfusion       Subjective  Postop Day 1:     HPI    The patient feels well.  Her pain is well controlled with meds.   She is ambulating well.  Patient describes her bleeding as no bleeding.      Review of Systems   Constitutional: Negative.    HENT: Negative.    Eyes: Negative.    Respiratory: Negative.    Cardiovascular: Negative.    Gastrointestinal: Positive for abdominal pain.   Endocrine: Negative.    Musculoskeletal: Negative.    Skin: Negative.    Allergic/Immunologic: Negative.    Neurological: Negative.    Hematological: Negative.    Psychiatric/Behavioral: Negative.         /61 (BP Location: Right arm, Patient Position: Lying)   Pulse 70   Temp 98.2 °F (36.8 °C) (Oral)   Resp 18   Ht 175.3 cm (69\")   Wt 107 kg (236 lb)   LMP 2019   SpO2 96%   BMI 34.85 kg/m²       Physical Exam:  General:  no acute distresss.  Abdomen: soft, NT, fundus firm and below umbilicus  Extremities: no calf tenderness    Physical Exam   Constitutional: She is oriented to person, place, and time. She appears well-developed and well-nourished.   Abdominal: Soft. Bowel sounds are normal. She exhibits no distension and no mass. There is no tenderness. There is no rebound and no guarding. No hernia.   Incision clean dry and intact   Musculoskeletal: Normal range of motion.   Neurological: She is alert and oriented to person, place, and time.   Skin: Skin is warm and dry.   Psychiatric: She has a normal mood and affect. Her behavior is normal. Judgment and thought content normal.   Nursing note and vitals reviewed.      Lab results reviewed:  Yes.   Results from last 7 days   Lab Units 19  0831   HEMOGLOBIN g/dL 8.5*                 1) DAY#1: Progressing well. Hgb:8.5    2) PostOP Care: routine    3) Dispo: home in am if stable.             Germain Arenas" MD [place]  4/4/2019  8:23 PM  8:23 PM  04/02/2019

## 2019-04-05 NOTE — PLAN OF CARE
Problem: Hysterectomy (Adult)  Goal: Signs and Symptoms of Listed Potential Problems Will be Absent, Minimized or Managed (Hysterectomy)  Outcome: Outcome(s) achieved Date Met: 04/05/19    Goal: Anesthesia/Sedation Recovery  Outcome: Outcome(s) achieved Date Met: 04/05/19      Problem: Skin Injury Risk (Adult)  Goal: Identify Related Risk Factors and Signs and Symptoms  Outcome: Outcome(s) achieved Date Met: 04/05/19    Goal: Skin Health and Integrity  Outcome: Outcome(s) achieved Date Met: 04/05/19

## 2019-04-05 NOTE — PLAN OF CARE
Problem: Patient Care Overview  Goal: Plan of Care Review  Outcome: Ongoing (interventions implemented as appropriate)   04/05/19 0319   Coping/Psychosocial   Plan of Care Reviewed With family     Goal: Individualization and Mutuality  Outcome: Ongoing (interventions implemented as appropriate)   04/05/19 0319   Individualization   Patient Specific Preferences pain control   Patient Specific Goals (Include Timeframe) walking hallway   Patient Specific Interventions keep pain under control     Goal: Interprofessional Rounds/Family Conf  Outcome: Ongoing (interventions implemented as appropriate)   04/05/19 0319   Interdisciplinary Rounds/Family Conf   Participants family       Problem: Anemia (Adult)  Goal: Identify Related Risk Factors and Signs and Symptoms  Outcome: Ongoing (interventions implemented as appropriate)   04/05/19 0319   Anemia (Adult)   Related Risk Factors (Anemia) bleeding     Goal: Symptom Improvement  Outcome: Ongoing (interventions implemented as appropriate)   04/05/19 0319   Anemia (Adult)   Symptom Improvement making progress toward outcome       Problem: Hysterectomy (Adult)  Goal: Signs and Symptoms of Listed Potential Problems Will be Absent, Minimized or Managed (Hysterectomy)  Outcome: Ongoing (interventions implemented as appropriate)   04/05/19 0319   Goal/Outcome Evaluation   Problems Assessed (Hysterectomy) all   Problems Present (Hysterectomy) none     Goal: Anesthesia/Sedation Recovery  Outcome: Ongoing (interventions implemented as appropriate)   04/05/19 0319   Goal/Outcome Evaluation   Anesthesia/Sedation Recovery progressing toward baseline       Problem: Skin Injury Risk (Adult)  Goal: Identify Related Risk Factors and Signs and Symptoms  Outcome: Ongoing (interventions implemented as appropriate)   04/05/19 0319   Skin Injury Risk (Adult)   Related Risk Factors (Skin Injury Risk) infection     Goal: Skin Health and Integrity  Outcome: Ongoing (interventions implemented as  appropriate)   04/05/19 0319   Skin Injury Risk (Adult)   Skin Health and Integrity making progress toward outcome

## 2019-04-05 NOTE — DISCHARGE SUMMARY
Date of Admission: 4/2/2019  6:17 PM      Date of Discharge:  4/5/2019    Admitting Service: [facility]    Admission Diagnosis:   1. 37 yo with menometrorrhagia  2. Anemia  3. Fibroid uterus    Discharge Diagnosis:   Same, plus:  4. S/P dx LSC, KRIK with BS  5. S/P blood transfusion    Presenting Problem/History of Present Illness  Menometrorrhagia [N92.1]     *  Hospital Course  Patient is a 38 y.o. female presented with heavy vaginal bleeding, anemia jalen fibroid uterus. She received 2 unites of PRBCS before her procedure and had a dx lsc, KIRK with BS and ovarian conservation. Her admission HgB was 7.7 and her postop level was 8.5. By POD # 2 she was ambulating and tolerating a regular diet. She is very anxious to go home today and we discussed that she will be able to go home if she passes gas. Postop instructions and restrictions were reviewed with the patient in detail and all of her questions were answered.     Procedures Performed  Procedure(s):  diagnosti laparoscopy,  Exploratory laparotomy, total abdominal hysterectomy with bilateral salpingectomy.       Consults:   Consults     No orders found from 3/4/2019 to 4/3/2019.          Pertinent Test Results: path pending    Condition on Discharge:  good    Vital Signs  Temp:  [97.7 °F (36.5 °C)-98.6 °F (37 °C)] 98.6 °F (37 °C)  Heart Rate:  [69-79] 69  Resp:  [16-20] 20  BP: (106-129)/(61-75) 106/73    Physical Exam:   Physical Exam   Constitutional: She is oriented to person, place, and time. She appears well-developed and well-nourished.   HENT:   Head: Normocephalic and atraumatic.   Eyes: Conjunctivae are normal. No scleral icterus.   Neck: Neck supple. No thyromegaly present.   Cardiovascular: Normal rate, regular rhythm and normal heart sounds.   Pulmonary/Chest: Effort normal and breath sounds normal.   Abdominal: Soft. Bowel sounds are normal. She exhibits no distension and no mass. There is tenderness. There is no rebound and no guarding. No hernia.    Incision C/D/I, no erythema  Appropriate TTP postop  BS present but hypoactive   Neurological: She is alert and oriented to person, place, and time.   Skin: Skin is warm and dry.   Psychiatric: She has a normal mood and affect. Her behavior is normal. Judgment and thought content normal.   Nursing note and vitals reviewed.      Discharge Disposition  Home or Self Care    Discharge Medications     Discharge Medications      New Medications      Instructions Start Date   docusate sodium 100 MG capsule   100 mg, Oral, 2 Times Daily PRN      ibuprofen 800 MG tablet  Commonly known as:  ADVIL,MOTRIN   800 mg, Oral, 3 Times Daily PRN      oxyCODONE-acetaminophen 5-325 MG per tablet  Commonly known as:  PERCOCET   1 tablet, Oral, Every 4 Hours PRN         Continue These Medications      Instructions Start Date   iron polysaccharides 150 MG capsule  Commonly known as:  NIFEREX   150 mg, Oral, 2 Times Daily      vitamin B-12 100 MCG tablet  Commonly known as:  CYANOCOBALAMIN   50 mcg, Oral, Daily             Discharge Diet: regular    Activity at Discharge:   Activity Instructions     Discharge Activity      1) No driving for 14 days and no longer taking narcotics.   2) Return to school / work in 45 days.  3) May shower / sponge bathe   4) Do not lift / push / pull more then 15 lbs.    Pelvic Rest      Number of Days:  45      pelvic rest, no driving a car x 14 days, no heavy lifting, pushing or pulling    Follow-up Appointments  RTO 2 weeks postop check  Additional Instructions for the Follow-ups that You Need to Schedule     Discharge Follow-up with Specialty: GYN; 2 Weeks   As directed      Specialty:  GYN    Follow Up:  2 Weeks    Follow Up Details:  postop check Dr Garay         Notify Physician or Go To The ED For the Following Conditions   As directed      Call for temp > 101, heavy vaginal bleeding, redness or drainage from incision, persistent nausea or vomiting or general worsening of condition    Order Comments:   Call for temp > 101, heavy vaginal bleeding, redness or drainage from incision, persistent nausea or vomiting or general worsening of condition                Test Results Pending at Discharge       Emily Driscoll MD  04/05/19  10:08 AM    Time: Discharge 25 min

## 2019-04-05 NOTE — PLAN OF CARE
Problem: Patient Care Overview  Goal: Plan of Care Review  Outcome: Outcome(s) achieved Date Met: 04/05/19 04/05/19 1230   Coping/Psychosocial   Plan of Care Reviewed With patient;spouse   Plan of Care Review   Progress improving   OTHER   Outcome Summary vss, pain controlled with current medications, ambulates well, adequate i/o, passing flatus     Goal: Individualization and Mutuality  Outcome: Outcome(s) achieved Date Met: 04/05/19    Goal: Discharge Needs Assessment  Outcome: Outcome(s) achieved Date Met: 04/05/19    Goal: Interprofessional Rounds/Family Conf  Outcome: Outcome(s) achieved Date Met: 04/05/19      Problem: Anemia (Adult)  Goal: Identify Related Risk Factors and Signs and Symptoms  Outcome: Outcome(s) achieved Date Met: 04/05/19    Goal: Symptom Improvement  Outcome: Outcome(s) achieved Date Met: 04/05/19      Problem: Hysterectomy (Adult)  Goal: Signs and Symptoms of Listed Potential Problems Will be Absent, Minimized or Managed (Hysterectomy)  Outcome: Outcome(s) achieved Date Met: 04/05/19    Goal: Anesthesia/Sedation Recovery  Outcome: Outcome(s) achieved Date Met: 04/05/19      Problem: Skin Injury Risk (Adult)  Goal: Identify Related Risk Factors and Signs and Symptoms  Outcome: Outcome(s) achieved Date Met: 04/05/19    Goal: Skin Health and Integrity  Outcome: Outcome(s) achieved Date Met: 04/05/19

## 2019-04-06 NOTE — NURSING NOTE
Case Management Discharge Note    Final Note: dc home to self care    Destination      No service has been selected for the patient.      Durable Medical Equipment      No service has been selected for the patient.      Dialysis/Infusion      No service has been selected for the patient.      Home Medical Care      No service has been selected for the patient.      Therapy      No service has been selected for the patient.      Community Resources      No service has been selected for the patient.             Final Discharge Disposition Code: 01 - home or self-care

## 2019-04-08 ENCOUNTER — TELEPHONE (OUTPATIENT)
Dept: OBSTETRICS AND GYNECOLOGY | Facility: CLINIC | Age: 39
End: 2019-04-08

## 2019-04-08 NOTE — TELEPHONE ENCOUNTER
That isnt an area where we operated. Maybe allergic to the wash? But I would expect that all over her abdomen. If it is spreading or worsening then she needs to take benadryl and see if it improves.

## 2019-04-08 NOTE — TELEPHONE ENCOUNTER
Patient calling states she had surgery last Wednesday and she woke up with a rash above her belly button. Would like to know if it's ok.

## 2019-04-15 ENCOUNTER — OFFICE VISIT (OUTPATIENT)
Dept: OBSTETRICS AND GYNECOLOGY | Facility: CLINIC | Age: 39
End: 2019-04-15

## 2019-04-15 VITALS
HEIGHT: 69 IN | WEIGHT: 231.2 LBS | BODY MASS INDEX: 34.24 KG/M2 | DIASTOLIC BLOOD PRESSURE: 84 MMHG | SYSTOLIC BLOOD PRESSURE: 126 MMHG

## 2019-04-15 DIAGNOSIS — Z98.890 POST-OPERATIVE STATE: Primary | ICD-10-CM

## 2019-04-15 PROCEDURE — 99024 POSTOP FOLLOW-UP VISIT: CPT | Performed by: OBSTETRICS & GYNECOLOGY

## 2019-04-15 NOTE — PROGRESS NOTES
"Post op VISIT    Chief Complaint: post op visit    April Pieter is a 38 y.o. patient who presents for follow up after exlap with KIRK/BS on 4/3/2019. Pt has not been taking pain meds except ibuprofen. Pt is doing well. No VB. No abdominal pain. Pt had a preop blood transfusion with 2 units PRBCs. Her discharge hgb was 8.5. She is on iron supplement.  Chief Complaint   Patient presents with   • Post-op             The following portions of the patient's history were reviewed and updated as appropriate: allergies, current medications and problem list.    Review of Systems   Gastrointestinal: Negative for abdominal distention, abdominal pain, constipation, diarrhea, nausea and vomiting.   Genitourinary: Negative for pelvic pain, vaginal bleeding, vaginal discharge and vaginal pain.       /84   Ht 175.3 cm (69.02\")   Wt 105 kg (231 lb 3.2 oz)   LMP 04/03/2019   Breastfeeding? No   BMI 34.13 kg/m²     Physical Exam   Constitutional: She is oriented to person, place, and time. She appears well-developed and well-nourished. No distress.   Abdominal: Soft. She exhibits no distension. There is no tenderness. There is no rigidity and no guarding.   C/D/I. Well healing.   Neurological: She is alert and oriented to person, place, and time. No cranial nerve deficit. Coordination normal.   Skin: She is not diaphoretic.   Psychiatric: She has a normal mood and affect. Her behavior is normal. Judgment and thought content normal.   Vitals reviewed.        Assessment/Plan   April was seen today for post-op.    Diagnoses and all orders for this visit:    Post-operative state    39yo s/p diagnostic laparoscopy with exploratory laparotomy and KIRK    1) POD# 4/3/19. Pt is progressing well. She has no complaints. Pathology reviewed and benign. Intraop photos shared with pt.    2) Anemia: Chronic anemia due to menometrorrhagia. Pt had a preop blood transfusion of 2units PRBCs due to a starting hgb of 7.7. Pt was discharged to " home with a post op hhgb of 8.5. She is on an iron supplement and encouraged to continue for 3 months. Will check H/H at 6 week post op visit.    3) Continue precautions and pelvic rest. Pt is considering returning to work for half days in 2 weeks.               Return in about 4 weeks (around 5/13/2019).      Rosemarie Garay, DO    4/17/2019  12:17 AM

## 2019-04-24 ENCOUNTER — TELEPHONE (OUTPATIENT)
Dept: OBSTETRICS AND GYNECOLOGY | Facility: CLINIC | Age: 39
End: 2019-04-24

## 2019-04-24 NOTE — TELEPHONE ENCOUNTER
Pt called and would like to go back to work after her 5 weeks and then work only half days the last week? Are you ok with that?  She also wants to know if she will have any weight lifting restrictions? 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

## 2019-04-25 NOTE — TELEPHONE ENCOUNTER
I am fine with her returning to work and doing half days. She cannot do any heavy lifting until cleared from her 6 week appt w me. Nothing over 10-15 pounds

## 2019-05-13 ENCOUNTER — OFFICE VISIT (OUTPATIENT)
Dept: OBSTETRICS AND GYNECOLOGY | Facility: CLINIC | Age: 39
End: 2019-05-13

## 2019-05-13 VITALS
DIASTOLIC BLOOD PRESSURE: 74 MMHG | BODY MASS INDEX: 34.7 KG/M2 | SYSTOLIC BLOOD PRESSURE: 108 MMHG | HEIGHT: 69 IN | WEIGHT: 234.3 LBS

## 2019-05-13 DIAGNOSIS — Z90.710 S/P TAH (TOTAL ABDOMINAL HYSTERECTOMY): Primary | ICD-10-CM

## 2019-05-13 DIAGNOSIS — D50.8 OTHER IRON DEFICIENCY ANEMIA: ICD-10-CM

## 2019-05-13 LAB
HCT VFR BLD AUTO: 37.3 % (ref 34–46.6)
HGB BLD-MCNC: 10.7 G/DL (ref 12–15.9)

## 2019-05-13 PROCEDURE — 99024 POSTOP FOLLOW-UP VISIT: CPT | Performed by: OBSTETRICS & GYNECOLOGY

## 2019-05-13 NOTE — PROGRESS NOTES
"PROBLEM VISIT    Chief Complaint: post op      April Pieter is a 38 y.o. patient who presents for follow up after diagnostic laparoscopy with exploratory laparotomy and KIRK on 4/3/2019. No VB no bowel or bladder complaints. No VB.   Chief Complaint   Patient presents with   • Post-op             The following portions of the patient's history were reviewed and updated as appropriate: allergies, current medications and problem list.    Review of Systems   Gastrointestinal: Negative for abdominal distention, abdominal pain, constipation, diarrhea, nausea and vomiting.   Genitourinary: Negative for difficulty urinating, frequency, pelvic pain, urgency, vaginal bleeding, vaginal discharge and vaginal pain.       /74   Ht 175.3 cm (69.02\")   Wt 106 kg (234 lb 4.8 oz)   LMP 04/03/2019   Breastfeeding? No   BMI 34.58 kg/m²     Physical Exam   Constitutional: She is oriented to person, place, and time. She appears well-developed and well-nourished. No distress.   Abdominal: Soft. She exhibits no distension and no mass. There is no tenderness. There is no guarding.   Incision C/D/I   Genitourinary: There is no rash, tenderness, lesion or injury on the right labia. There is no rash, tenderness, lesion or injury on the left labia. No tenderness or bleeding in the vagina. No signs of injury around the vagina. No vaginal discharge found.   Genitourinary Comments: Vaginal cuff intact and nontender   Neurological: She is alert and oriented to person, place, and time. No cranial nerve deficit. Coordination normal.   Skin: She is not diaphoretic.   Psychiatric: She has a normal mood and affect. Her behavior is normal. Judgment and thought content normal.   Vitals reviewed.        Assessment/Plan   April was seen today for post-op.    Diagnoses and all orders for this visit:    S/P KIRK (total abdominal hysterectomy)    Other iron deficiency anemia  -     Hemoglobin & Hematocrit, Blood          39yo s/p diagnostic " laparoscopy with exploratory laparotomy and KIRK     1) POD# 4/3/19. Pt is progressing well. She has no complaints. Pathology reviewed and benign.      2) Anemia: Chronic anemia due to menometrorrhagia. Pt had a preop blood transfusion of 2units PRBCs due to a starting hgb of 7.7. Pt was discharged to home with a post op hhgb of 8.5. She is on an iron supplement and encouraged to continue for 3 months. Will check H/H today.     3) Pt to return to work full time with no restrictions starting 5/15/2019.               Return in about 1 year (around 5/13/2020) for Annual physical.      Rosemarie Garay, DO    5/13/2019  11:10 AM

## 2021-04-27 ENCOUNTER — TRANSCRIBE ORDERS (OUTPATIENT)
Dept: ADMINISTRATIVE | Facility: HOSPITAL | Age: 41
End: 2021-04-27

## 2021-04-27 ENCOUNTER — TRANSCRIBE ORDERS (OUTPATIENT)
Dept: LAB | Facility: HOSPITAL | Age: 41
End: 2021-04-27

## 2021-04-27 DIAGNOSIS — R06.02 SHORTNESS OF BREATH: Primary | ICD-10-CM

## 2021-04-27 DIAGNOSIS — Z01.818 OTHER SPECIFIED PRE-OPERATIVE EXAMINATION: Primary | ICD-10-CM

## 2021-04-30 ENCOUNTER — APPOINTMENT (OUTPATIENT)
Dept: LAB | Facility: HOSPITAL | Age: 41
End: 2021-04-30

## 2021-05-03 ENCOUNTER — APPOINTMENT (OUTPATIENT)
Dept: PULMONOLOGY | Facility: HOSPITAL | Age: 41
End: 2021-05-03

## 2021-05-03 ENCOUNTER — LAB (OUTPATIENT)
Dept: LAB | Facility: HOSPITAL | Age: 41
End: 2021-05-03

## 2021-05-03 DIAGNOSIS — Z01.818 OTHER SPECIFIED PRE-OPERATIVE EXAMINATION: ICD-10-CM

## 2021-05-03 LAB — SARS-COV-2 RNA PNL SPEC NAA+PROBE: NOT DETECTED

## 2021-05-03 PROCEDURE — 87635 SARS-COV-2 COVID-19 AMP PRB: CPT | Performed by: OBSTETRICS & GYNECOLOGY

## 2021-05-03 PROCEDURE — C9803 HOPD COVID-19 SPEC COLLECT: HCPCS

## 2021-05-05 ENCOUNTER — HOSPITAL ENCOUNTER (OUTPATIENT)
Dept: PULMONOLOGY | Facility: HOSPITAL | Age: 41
Discharge: HOME | End: 2021-05-05
Admitting: FAMILY MEDICINE

## 2021-05-05 DIAGNOSIS — R06.02 SHORTNESS OF BREATH: ICD-10-CM

## 2021-05-05 LAB
BDY SITE: NORMAL
HGB BLDA-MCNC: 14.5 G/DL (ref 13.5–17.5)
SAO2 % BLDCOA: 94.7 % (ref 94–99)

## 2021-05-05 PROCEDURE — 94729 DIFFUSING CAPACITY: CPT

## 2021-05-05 PROCEDURE — 82820 HEMOGLOBIN-OXYGEN AFFINITY: CPT

## 2021-05-05 PROCEDURE — 94726 PLETHYSMOGRAPHY LUNG VOLUMES: CPT

## 2021-05-05 PROCEDURE — 94010 BREATHING CAPACITY TEST: CPT

## 2021-06-19 ENCOUNTER — TRANSCRIBE ORDERS (OUTPATIENT)
Dept: ADMINISTRATIVE | Facility: HOSPITAL | Age: 41
End: 2021-06-19

## 2021-06-19 DIAGNOSIS — R94.5 NONSPECIFIC ABNORMAL RESULTS OF LIVER FUNCTION STUDY: Primary | ICD-10-CM

## 2021-06-25 ENCOUNTER — TRANSCRIBE ORDERS (OUTPATIENT)
Dept: ADMINISTRATIVE | Facility: HOSPITAL | Age: 41
End: 2021-06-25

## 2021-06-25 DIAGNOSIS — R79.89 ABNORMAL LIVER FUNCTION TEST: Primary | ICD-10-CM

## 2021-07-06 ENCOUNTER — HOSPITAL ENCOUNTER (OUTPATIENT)
Dept: ULTRASOUND IMAGING | Facility: HOSPITAL | Age: 41
Discharge: HOME | End: 2021-07-06
Admitting: FAMILY MEDICINE

## 2021-07-06 DIAGNOSIS — R79.89 ABNORMAL LIVER FUNCTION TEST: ICD-10-CM

## 2021-07-06 PROCEDURE — 76705 ECHO EXAM OF ABDOMEN: CPT

## 2021-11-30 ENCOUNTER — HOSPITAL ENCOUNTER (OUTPATIENT)
Dept: INFUSION THERAPY | Facility: HOSPITAL | Age: 41
Discharge: HOME | End: 2021-11-30
Admitting: FAMILY MEDICINE

## 2021-11-30 VITALS
HEART RATE: 83 BPM | TEMPERATURE: 99.8 F | OXYGEN SATURATION: 98 % | RESPIRATION RATE: 16 BRPM | DIASTOLIC BLOOD PRESSURE: 71 MMHG | SYSTOLIC BLOOD PRESSURE: 109 MMHG

## 2021-11-30 PROCEDURE — M0243 CASIRIVI AND IMDEVI INFUSION: HCPCS | Performed by: FAMILY MEDICINE

## 2021-11-30 PROCEDURE — 25010000002 INJECTION, CASIRIVIMAB AND IMDEVIMAB, 1200 MG: Performed by: FAMILY MEDICINE

## 2021-11-30 RX ORDER — DIPHENHYDRAMINE HYDROCHLORIDE 50 MG/ML
50 INJECTION INTRAMUSCULAR; INTRAVENOUS ONCE AS NEEDED
Status: DISCONTINUED | OUTPATIENT
Start: 2021-11-30 | End: 2021-12-02 | Stop reason: HOSPADM

## 2021-11-30 RX ORDER — SODIUM CHLORIDE 9 MG/ML
30 INJECTION, SOLUTION INTRAVENOUS ONCE
Status: DISCONTINUED | OUTPATIENT
Start: 2021-11-30 | End: 2021-12-02 | Stop reason: HOSPADM

## 2021-11-30 RX ORDER — EPINEPHRINE 1 MG/ML
0.3 INJECTION, SOLUTION INTRAMUSCULAR; SUBCUTANEOUS ONCE AS NEEDED
Status: DISCONTINUED | OUTPATIENT
Start: 2021-11-30 | End: 2021-12-02 | Stop reason: HOSPADM

## 2021-11-30 RX ORDER — METHYLPREDNISOLONE SODIUM SUCCINATE 125 MG/2ML
125 INJECTION, POWDER, LYOPHILIZED, FOR SOLUTION INTRAMUSCULAR; INTRAVENOUS ONCE AS NEEDED
Status: DISCONTINUED | OUTPATIENT
Start: 2021-11-30 | End: 2021-12-02 | Stop reason: HOSPADM

## 2021-11-30 RX ORDER — DIPHENHYDRAMINE HCL 50 MG
50 CAPSULE ORAL ONCE AS NEEDED
Status: DISCONTINUED | OUTPATIENT
Start: 2021-11-30 | End: 2021-12-02 | Stop reason: HOSPADM

## 2021-11-30 RX ADMIN — CASIRIVIMAB AND IMDEVIMAB: 600; 600 INJECTION, SOLUTION, CONCENTRATE INTRAVENOUS at 16:15

## 2024-07-30 ENCOUNTER — TRANSCRIBE ORDERS (OUTPATIENT)
Dept: CARDIOLOGY | Facility: HOSPITAL | Age: 44
End: 2024-07-30
Payer: COMMERCIAL

## 2024-07-30 DIAGNOSIS — R00.2 HEART PALPITATIONS: Primary | ICD-10-CM

## 2024-08-13 ENCOUNTER — HOSPITAL ENCOUNTER (OUTPATIENT)
Dept: CARDIOLOGY | Facility: HOSPITAL | Age: 44
Discharge: HOME | End: 2024-08-13
Admitting: FAMILY MEDICINE
Payer: COMMERCIAL

## 2024-08-13 DIAGNOSIS — R00.2 HEART PALPITATIONS: ICD-10-CM

## 2024-08-13 PROCEDURE — 93246 EXT ECG>7D<15D RECORDING: CPT

## 2024-08-19 ENCOUNTER — OFFICE VISIT (OUTPATIENT)
Dept: OBSTETRICS AND GYNECOLOGY | Facility: CLINIC | Age: 44
End: 2024-08-19
Payer: COMMERCIAL

## 2024-08-19 VITALS
WEIGHT: 248 LBS | SYSTOLIC BLOOD PRESSURE: 120 MMHG | BODY MASS INDEX: 36.73 KG/M2 | HEIGHT: 69 IN | DIASTOLIC BLOOD PRESSURE: 88 MMHG

## 2024-08-19 DIAGNOSIS — Z11.51 SPECIAL SCREENING EXAMINATION FOR HUMAN PAPILLOMAVIRUS (HPV): ICD-10-CM

## 2024-08-19 DIAGNOSIS — Z01.419 PAP SMEAR, AS PART OF ROUTINE GYNECOLOGICAL EXAMINATION: Primary | ICD-10-CM

## 2024-08-19 DIAGNOSIS — Z12.31 ENCOUNTER FOR SCREENING MAMMOGRAM FOR MALIGNANT NEOPLASM OF BREAST: ICD-10-CM

## 2024-08-19 DIAGNOSIS — Z01.419 ROUTINE GYNECOLOGICAL EXAMINATION: ICD-10-CM

## 2024-08-19 LAB
BILIRUB BLD-MCNC: NEGATIVE MG/DL
CLARITY, POC: CLEAR
COLOR UR: YELLOW
GLUCOSE UR STRIP-MCNC: NEGATIVE MG/DL
KETONES UR QL: NEGATIVE
LEUKOCYTE EST, POC: NEGATIVE
NITRITE UR-MCNC: NEGATIVE MG/ML
PH UR: 5 [PH] (ref 5–8)
PROT UR STRIP-MCNC: NEGATIVE MG/DL
RBC # UR STRIP: NEGATIVE /UL
SP GR UR: 1.01 (ref 1–1.03)
UROBILINOGEN UR QL: NORMAL

## 2024-08-19 PROCEDURE — 81002 URINALYSIS NONAUTO W/O SCOPE: CPT | Performed by: OBSTETRICS & GYNECOLOGY

## 2024-08-19 PROCEDURE — 99386 PREV VISIT NEW AGE 40-64: CPT | Performed by: OBSTETRICS & GYNECOLOGY

## 2024-08-19 RX ORDER — LEVOTHYROXINE SODIUM 0.05 MG/1
TABLET ORAL
COMMUNITY
Start: 2024-07-30

## 2024-08-19 RX ORDER — ALBUTEROL SULFATE 90 UG/1
AEROSOL, METERED RESPIRATORY (INHALATION)
COMMUNITY
Start: 2024-07-30

## 2024-08-19 RX ORDER — GLIPIZIDE 5 MG/1
TABLET, FILM COATED, EXTENDED RELEASE ORAL
COMMUNITY
Start: 2024-08-06

## 2024-08-19 NOTE — PROGRESS NOTES
GYN Annual Exam     CC- Here for annual exam.     Carolee Stapleton is a 43 y.o. female new patient who presents for annual well woman exam. She had a a KIRK with OC in 2019 by Dr Garay for large fibroids and anemia. Her path was B9. She has not had any gyn care since then. No VB. She declines Gardasil vaccine.     OB History          1    Para   1    Term   1            AB        Living   1         SAB        IAB        Ectopic        Molar        Multiple        Live Births              Obstetric Comments   1                Menarche: 16  Current contraception:  2019- KIRK with OC fibroids  History of abnormal Pap smear: no  History of abnormal mammogram: no  Family history of uterine, colon or ovarian cancer: yes - MGF colon > 50   Family history of breast cancer: yes - m great aunt in 30s  H/o STDs: none  Last pap: ?   Gardasil:missed  GRETA: none    Health Maintenance   Topic Date Due    Annual Gynecologic Pelvic and Breast Exam  Never done    MAMMOGRAM  Never done    BMI FOLLOWUP  Never done    TDAP/TD VACCINES (2 - Tdap) 2009    HEPATITIS C SCREENING  Never done    ANNUAL PHYSICAL  Never done    PAP SMEAR  Never done    COVID-19 Vaccine ( - - season) Never done    INFLUENZA VACCINE  2024    Pneumococcal Vaccine 0-64  Aged Out       Past Medical History:   Diagnosis Date    Anemia     Anxiety     Diabetes mellitus     Disease of thyroid gland     Fibroid     Fibroids     sched TLH    History of asthma     childhood and anxiety related    History of migraine     S/P KIRK (total abdominal hysterectomy) 2019       Past Surgical History:   Procedure Laterality Date    PELVIC LAPAROSCOPY      TOTAL LAPAROSCOPIC HYSTERECTOMY N/A 2019    Procedure: diagnosti laparoscopy,  Exploratory laparotomy, total abdominal hysterectomy with bilateral salpingectomy.;  Surgeon: Rosemarie Garay DO;  Location: [facility];  Service: Obstetrics/Gynecology         Current Outpatient Medications:      albuterol sulfate  (90 Base) MCG/ACT inhaler, , Disp: , Rfl:     glipizide (GLUCOTROL XL) 5 MG ER tablet, , Disp: , Rfl:     levothyroxine (SYNTHROID, LEVOTHROID) 50 MCG tablet, , Disp: , Rfl:     metFORMIN (GLUCOPHAGE) 500 MG tablet, , Disp: , Rfl:     vitamin B-12 (CYANOCOBALAMIN) 100 MCG tablet, Take 0.5 tablets by mouth Daily., Disp: , Rfl:     Allergies   Allergen Reactions    Amoxicillin Hives    Azithromycin Rash    Levaquin [Levofloxacin] Rash    Prednisone Rash       Social History     Tobacco Use    Smoking status: Never    Smokeless tobacco: Never   Vaping Use    Vaping status: Never Used   Substance Use Topics    Alcohol use: No    Drug use: No       Family History   Problem Relation Age of Onset    Diabetes Mother     Hypertension Mother     Heart disease Mother         pacemaker    Hypertension Maternal Grandmother     Aneurysm Maternal Grandmother     Colon cancer Maternal Grandfather     Hypertension Maternal Grandfather     Aneurysm Maternal Aunt     Breast cancer Other     Uterine cancer Neg Hx     Ovarian cancer Neg Hx     Deep vein thrombosis Neg Hx     Pulmonary embolism Neg Hx        Review of Systems   Constitutional:  Positive for activity change. Negative for appetite change, fatigue, fever and unexpected weight change.   Eyes:  Negative for photophobia and visual disturbance.   Respiratory:  Negative for cough and shortness of breath.    Cardiovascular:  Positive for palpitations. Negative for chest pain.   Gastrointestinal:  Negative for abdominal distention, abdominal pain, constipation, diarrhea and nausea.   Endocrine: Negative for cold intolerance and heat intolerance.   Genitourinary:  Negative for dyspareunia, dysuria, menstrual problem, pelvic pain, vaginal bleeding and vaginal discharge.   Musculoskeletal:  Negative for back pain.   Skin:  Negative for color change and rash.   Neurological:  Negative for headaches.   Hematological:  Negative for adenopathy. Does not  "bruise/bleed easily.   Psychiatric/Behavioral:  Negative for dysphoric mood. The patient is not nervous/anxious.        /88   Ht 175.3 cm (69\")   Wt 112 kg (248 lb)   LMP 04/03/2019   BMI 36.62 kg/m²     Physical Exam  Vitals and nursing note reviewed. Exam conducted with a chaperone present.   Constitutional:       Appearance: Normal appearance. She is well-developed. She is obese.   HENT:      Head: Normocephalic and atraumatic.   Eyes:      General: No scleral icterus.     Conjunctiva/sclera: Conjunctivae normal.   Neck:      Thyroid: No thyromegaly.   Cardiovascular:      Rate and Rhythm: Normal rate and regular rhythm.   Pulmonary:      Effort: Pulmonary effort is normal.      Breath sounds: Normal breath sounds.   Chest:   Breasts:     Right: No swelling, bleeding, inverted nipple, mass, nipple discharge, skin change or tenderness.      Left: No swelling, bleeding, inverted nipple, mass, nipple discharge, skin change or tenderness.   Abdominal:      General: Bowel sounds are normal. There is no distension.      Palpations: Abdomen is soft. There is no mass.      Tenderness: There is no abdominal tenderness. There is no guarding or rebound.      Hernia: No hernia is present.   Genitourinary:     Exam position: Supine.      Labia:         Right: No rash, tenderness or lesion.         Left: No rash, tenderness or lesion.       Urethra: No prolapse, urethral pain, urethral swelling or urethral lesion.      Vagina: No signs of injury and foreign body. No vaginal discharge, erythema, tenderness or bleeding.      Uterus: Absent.       Adnexa:         Right: No mass, tenderness or fullness.          Left: No mass, tenderness or fullness.        Comments: Uterus and cervix absent  Normal cuff  Exam limited by habitus  Musculoskeletal:      Cervical back: Neck supple.   Skin:     General: Skin is warm and dry.   Neurological:      Mental Status: She is alert and oriented to person, place, and time. "   Psychiatric:         Mood and Affect: Mood normal.         Behavior: Behavior normal.         Thought Content: Thought content normal.         Judgment: Judgment normal.            Assessment/Plan    1) GYN HM: pap/HPV  SBE demonstrated and encouraged.  2) STD screening: declines Condoms encouraged.  3) Contraception: status post hysterectomy  4) Family Planning: family planning: childbearing completed, encourage folic acid daily  5) Diet and Exercise discussed  6) Smoking Status: No  7) Social: no issues  8) MMG-  due, will schedule  9) Declines gardasil vaccine  10) Cscope- plan age 47  11) Follow up prn or 1 year annual        Diagnoses and all orders for this visit:    1. Pap smear, as part of routine gynecological examination (Primary)  -     IGP, Apt HPV,rfx 16 / 18,45    2. Routine gynecological examination  -     POC Urinalysis Dipstick  -     IGP, Apt HPV,rfx 16 / 18,45    3. Special screening examination for human papillomavirus (HPV)  -     IGP, Apt HPV,rfx 16 / 18,45    4. Encounter for screening mammogram for malignant neoplasm of breast  -     Mammo Screening Digital Tomosynthesis Bilateral With CAD; Future          Emily Susi Driscoll MD  08/19/2024    09:57 EDT

## 2024-08-21 ENCOUNTER — PATIENT ROUNDING (BHMG ONLY) (OUTPATIENT)
Dept: OBSTETRICS AND GYNECOLOGY | Facility: CLINIC | Age: 44
End: 2024-08-21
Payer: COMMERCIAL

## 2024-08-21 NOTE — PROGRESS NOTES
A MY-CHART MESSAGE HAS BEEN SENT TO THE PATIENT [place] [facility] [place]

## 2024-08-22 LAB
CYTOLOGIST CVX/VAG CYTO: NORMAL
CYTOLOGY CVX/VAG DOC CYTO: NORMAL
CYTOLOGY CVX/VAG DOC THIN PREP: NORMAL
DX ICD CODE: NORMAL
HPV I/H RISK 4 DNA CVX QL PROBE+SIG AMP: NEGATIVE
Lab: NORMAL
OTHER STN SPEC: NORMAL
STAT OF ADQ CVX/VAG CYTO-IMP: NORMAL

## 2024-10-02 ENCOUNTER — HOSPITAL ENCOUNTER (OUTPATIENT)
Dept: MAMMOGRAPHY | Facility: HOSPITAL | Age: 44
Discharge: HOME | End: 2024-10-02
Admitting: OBSTETRICS & GYNECOLOGY
Payer: COMMERCIAL

## 2024-10-02 DIAGNOSIS — Z12.31 ENCOUNTER FOR SCREENING MAMMOGRAM FOR MALIGNANT NEOPLASM OF BREAST: ICD-10-CM

## 2024-10-02 PROCEDURE — 77063 BREAST TOMOSYNTHESIS BI: CPT

## 2024-10-02 PROCEDURE — 77067 SCR MAMMO BI INCL CAD: CPT

## (undated) DEVICE — SUT VIC 0 CT1 CR8 18IN J840D

## (undated) DEVICE — OCCL COLPO PNEUMO  STRL

## (undated) DEVICE — SUT VIC 0 TIES 18IN J912G

## (undated) DEVICE — BNDG ADHS PLSTC 1X3IN LF

## (undated) DEVICE — GLV SURG SENSICARE MICRO PF LF 6 STRL

## (undated) DEVICE — PK TLH 90

## (undated) DEVICE — PAD SANI MAXI W/ADHS SNG WRP 11IN

## (undated) DEVICE — POOLE SUCTION HANDLE: Brand: CARDINAL HEALTH

## (undated) DEVICE — 1842 FOAM BLOCK NEEDLE COUNTER: Brand: DEVON

## (undated) DEVICE — SAFESECURE,SECUREMENT,FOLEY CATH,STERILE: Brand: MEDLINE

## (undated) DEVICE — ELECTRD BLD EXT EDGE 1P COAT 6.5IN STRL

## (undated) DEVICE — Device: Brand: CAUTERY TIP CLEANER

## (undated) DEVICE — UNDYED BRAIDED (POLYGLACTIN 910), SYNTHETIC ABSORBABLE SUTURE: Brand: COATED VICRYL

## (undated) DEVICE — APPL CHLORAPREP W/TINT 26ML ORNG

## (undated) DEVICE — DRSNG SURESITE WNDW 4X4.5

## (undated) DEVICE — TBG INSUFL W FLTR STRL

## (undated) DEVICE — SUT VIC 0 CT1 CR8 DYED JJ31G

## (undated) DEVICE — INTENDED FOR TISSUE SEPARATION, AND OTHER PROCEDURES THAT REQUIRE A SHARP SURGICAL BLADE TO PUNCTURE OR CUT.: Brand: BARD-PARKER ® STAINLESS STEEL BLADES

## (undated) DEVICE — TP SXN YANKR BULB STRL

## (undated) DEVICE — PENCL E/S HNDSWCH PUSHBTN HOLSTR 10FT

## (undated) DEVICE — SPNG LAP 18X18IN LF STRL PK/5

## (undated) DEVICE — TOWEL,OR,DSP,ST,BLUE,STD,4/PK,20PK/CS: Brand: MEDLINE

## (undated) DEVICE — TUBING, SUCTION, 1/4" X 12', STRAIGHT: Brand: MEDLINE

## (undated) DEVICE — GLV SURG SENSICARE MICRO PF LF 7.5 STRL